# Patient Record
Sex: FEMALE | Race: BLACK OR AFRICAN AMERICAN | Employment: FULL TIME | URBAN - METROPOLITAN AREA
[De-identification: names, ages, dates, MRNs, and addresses within clinical notes are randomized per-mention and may not be internally consistent; named-entity substitution may affect disease eponyms.]

---

## 2019-01-07 ENCOUNTER — HOSPITAL ENCOUNTER (EMERGENCY)
Age: 39
Discharge: HOME | End: 2019-01-07
Attending: EMERGENCY MEDICINE | Admitting: EMERGENCY MEDICINE
Payer: MEDICAID

## 2019-01-07 ENCOUNTER — APPOINTMENT (OUTPATIENT)
Dept: GENERAL RADIOLOGY | Age: 39
End: 2019-01-07
Attending: PHYSICIAN ASSISTANT
Payer: MEDICAID

## 2019-01-07 VITALS
DIASTOLIC BLOOD PRESSURE: 98 MMHG | BODY MASS INDEX: 32.82 KG/M2 | HEIGHT: 65 IN | OXYGEN SATURATION: 100 % | TEMPERATURE: 99 F | SYSTOLIC BLOOD PRESSURE: 139 MMHG | HEART RATE: 69 BPM | RESPIRATION RATE: 16 BRPM | WEIGHT: 197 LBS

## 2019-01-07 DIAGNOSIS — R03.0 ELEVATED BLOOD PRESSURE READING: ICD-10-CM

## 2019-01-07 DIAGNOSIS — S60.211A CONTUSION OF RIGHT WRIST, INITIAL ENCOUNTER: Primary | ICD-10-CM

## 2019-01-07 PROCEDURE — L3908 WHO COCK-UP NONMOLDE PRE OTS: HCPCS

## 2019-01-07 PROCEDURE — 73110 X-RAY EXAM OF WRIST: CPT

## 2019-01-07 PROCEDURE — 99283 EMERGENCY DEPT VISIT LOW MDM: CPT

## 2019-01-07 NOTE — LETTER
[address] [place] [address] Dr [facility] EMERGENCY DEPT 
[address] [facility] [address] 
[phone] Work/School Note Date: 1/7/2019 To Whom It May concern: Klever Jorge was seen and treated today in the emergency room by the following provider(s): 
Attending Provider: Amandeep Rich MD 
Physician Assistant: Yadira Salamnaca Klever Jorge may return to work on 1/12/19. Sincerely, 
 
 
 
 
Tiffanie Marvin

## 2019-01-07 NOTE — ED PROVIDER NOTES
EMERGENCY DEPARTMENT HISTORY AND PHYSICAL EXAM 
 
3:35 PM 
 
 
Date: 2019 Patient Name: Katherine Hogan History of Presenting Illness Chief Complaint Patient presents with  Wrist Pain History Provided By: Patient Chief Complaint: R wrist pain/edema Duration:  Days Timing:  Acute Location: R wrist 
Quality: Aching Severity: Moderate Modifying Factors: worse with movement Associated Symptoms: denies any other associated signs or symptoms Additional History (Context): Katherine Hogan is a 45 y.o. female with No significant past medical history who presents with c/o R wrist pain and swelling x 2 days. Notes she had a trip and fall, [address] injury. Has tried hot and cold to relieve symptoms, as well as Tylenol. Denies head injury or LOC. Terry Padron PCP: None Past History Past Medical History: 
Past Medical History:  
Diagnosis Date  Lupus Past Surgical History: 
Past Surgical History:  
Procedure Laterality Date  HX  SECTION    
 HX TUBAL LIGATION Family History: 
History reviewed. No pertinent family history. Social History: 
Social History Tobacco Use  Smoking status: Current Every Day Smoker  Smokeless tobacco: Never Used Substance Use Topics  Alcohol use: No  
  Frequency: Never  Drug use: No  
 
 
Allergies: 
No Known Allergies Review of Systems Review of Systems Constitutional: Negative for chills and fever. Respiratory: Negative for shortness of breath. Cardiovascular: Negative for chest pain. Gastrointestinal: Negative for abdominal pain, nausea and vomiting. Musculoskeletal: Positive for joint swelling and myalgias. Skin: Negative for rash. Neurological: Negative for weakness. All other systems reviewed and are negative. Physical Exam  
 
Visit Vitals BP (!) 139/98 (BP 1 Location: Left arm, BP Patient Position: At rest) Pulse 69 Temp 99 °F (37.2 °C) Resp 16 Ht 5' 5\" (1.651 m) Wt 89.4 kg (197 lb) LMP 12/28/2018 SpO2 100% BMI 32.78 kg/m² Physical Exam  
Constitutional: She appears well-developed and well-nourished. No distress. HENT:  
Head: Normocephalic and atraumatic. Neck: Normal range of motion. Neck supple. Cardiovascular: Normal rate, regular rhythm, normal heart sounds and intact distal pulses. Exam reveals no gallop and no friction rub. No murmur heard. Pulmonary/Chest: Effort normal and breath sounds normal. No respiratory distress. She has no wheezes. She has no rales. Musculoskeletal:  
     Right elbow: Normal. 
     Right wrist: She exhibits decreased range of motion, bony tenderness (moderate diffuse edema and TTP) and swelling. Hands: 
Sensation intact throughout digits, radial pulse 2+ Neurological: She is alert. Skin: Skin is warm. No rash noted. She is not diaphoretic. Nursing note and vitals reviewed. Diagnostic Study Results Labs - No results found for this or any previous visit (from the past 12 hour(s)). Radiologic Studies -  
XR WRIST RT AP/LAT/OBL MIN 3V    (Results Pending) RADIOLOGY FINDINGS 
R wrist X-ray shows no acute process Pending review by Radiologist 
Recorded by Kimberly Marshall PA-C Medical Decision Making I am the first provider for this patient. I reviewed the vital signs, available nursing notes, past medical history, past surgical history, family history and social history. Vital Signs-Reviewed the patient's vital signs. Records Reviewed: Nursing Notes and Old Medical Records (Time of Review: 3:35 PM) ED Course: Progress Notes, Reevaluation, and Consults: 
4:11 PM  Reviewed results with patient. Discussed need for close outpatient follow-up. Discussed strict return precautions, including weakness, numbness, or any other medical concerns.  
 
Provider Notes (Medical Decision Making): 44 yo F who presents due to R wrist pain and edema x days after injury. X-ray without acute process. Extremity neurovascularly intact. Will provide velcro wrist splint and referral to orthopedic. Diagnosis Clinical Impression: 1. Contusion of right wrist, initial encounter 2. Elevated blood pressure reading Disposition: home Follow-up Information Follow up With Specialties Details Why Contact Info [id number] [facility] EMERGENCY DEPT Emergency Medicine  If symptoms worsen [address] [facility] [address] [phone] [address] Orthopedic Surgery Schedule an appointment as soon as possible for a visit  [address], [address] [facility] 
[phone] Medication List  
  
You have not been prescribed any medications.

## 2019-01-07 NOTE — ED TRIAGE NOTES
Patient states trip and fall on Sunday causing injury to right wrist when she attempted to catch herself during fall. Right wrist and forearm proximal to wrist noted to be swollen.

## 2019-01-07 NOTE — DISCHARGE INSTRUCTIONS
Patient Education        Elevated Blood Pressure: Care Instructions  Your Care Instructions    Blood pressure is a measure of how hard the blood pushes against the walls of your arteries. It's normal for blood pressure to go up and down throughout the day. But if it stays up over time, you have high blood pressure. Two numbers tell you your blood pressure. The first number is the systolic pressure. It shows how hard the blood pushes when your heart is pumping. The second number is the diastolic pressure. It shows how hard the blood pushes between heartbeats, when your heart is relaxed and filling with blood. An ideal blood pressure in adults is less than 120/80 (say \"120 over 80\"). High blood pressure is 140/90 or higher. You have high blood pressure if your top number is 140 or higher or your bottom number is 90 or higher, or both. The main test for high blood pressure is simple, fast, and painless. To diagnose high blood pressure, your doctor will test your blood pressure at different times. After testing your blood pressure, your doctor may ask you to test it again when you are home. If you are diagnosed with high blood pressure, you can work with your doctor to make a long-term plan to manage it. Follow-up care is a key part of your treatment and safety. Be sure to make and go to all appointments, and call your doctor if you are having problems. It's also a good idea to know your test results and keep a list of the medicines you take. How can you care for yourself at home? · Do not smoke. Smoking increases your risk for heart attack and stroke. If you need help quitting, talk to your doctor about stop-smoking programs and medicines. These can increase your chances of quitting for good. · Stay at a healthy weight. · Try to limit how much sodium you eat to less than 2,300 milligrams (mg) a day. Your doctor may ask you to try to eat less than 1,500 mg a day. · Be physically active.  Get at least 30 minutes of exercise on most days of the week. Walking is a good choice. You also may want to do other activities, such as running, swimming, cycling, or playing tennis or team sports. · Avoid or limit alcohol. Talk to your doctor about whether you can drink any alcohol. · Eat plenty of fruits, vegetables, and low-fat dairy products. Eat less saturated and total fats. · Learn how to check your blood pressure at home. When should you call for help? Call your doctor now or seek immediate medical care if:  ? · Your blood pressure is much higher than normal (such as 180/110 or higher). ? · You think high blood pressure is causing symptoms such as:  ¨ Severe headache. ¨ Blurry vision. ? Watch closely for changes in your health, and be sure to contact your doctor if:  ? · You do not get better as expected. Where can you learn more? Go to http://leila-giles.info/. Enter E412 in the search box to learn more about \"Elevated Blood Pressure: Care Instructions. \"  Current as of: September 21, 2016  Content Version: 11.4  © 7587-3074 [employer]. Care instructions adapted under license by [employer] (which disclaims liability or warranty for this information). If you have questions about a medical condition or this instruction, always ask your healthcare professional. [address] any warranty or liability for your use of this information.

## 2019-01-11 ENCOUNTER — OFFICE VISIT (OUTPATIENT)
Dept: ORTHOPEDIC SURGERY | Age: 39
End: 2019-01-11

## 2019-01-11 VITALS
WEIGHT: 197 LBS | HEART RATE: 68 BPM | SYSTOLIC BLOOD PRESSURE: 135 MMHG | OXYGEN SATURATION: 100 % | BODY MASS INDEX: 32.82 KG/M2 | HEIGHT: 65 IN | DIASTOLIC BLOOD PRESSURE: 89 MMHG

## 2019-01-11 DIAGNOSIS — S60.211A CONTUSION OF RIGHT WRIST, INITIAL ENCOUNTER: Primary | ICD-10-CM

## 2019-01-11 RX ORDER — IBUPROFEN 600 MG/1
600 TABLET ORAL
Qty: 30 TAB | Refills: 1 | Status: CANCELLED | OUTPATIENT
Start: 2019-01-11

## 2019-01-11 RX ORDER — IBUPROFEN 800 MG/1
800 TABLET ORAL
Qty: 30 TAB | Refills: 0 | Status: SHIPPED | OUTPATIENT
Start: 2019-01-11 | End: 2019-04-19

## 2019-01-11 NOTE — PROGRESS NOTES
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 Worker's Compensation and legal considerations: none filed. Vitals:    19 0959   BP: 135/89   Pulse: 68   SpO2: 100%   Weight: 197 lb (89.4 kg)   Height: 5' 5\" (1.651 m)   PainSc:   5   LMP: 2018           Chief Complaint   Patient presents with    Wrist Pain     right wrist pain         HPI: Patient comes in today after suffering a fall on the right upper extremity. She was seen in the emergency department and had x-rays that did not show any fracture. She was placed into a wrist splint. She has been wearing it but she took it off today. She reports swelling in her forearm. Date of onset: 2019    Injury: Yes: Comment: Fall onto arm    Prior Treatment:  Yes: Comment: Placed into a splint in the emergency department    Numbness/ Tingling: No    ROS: Review of Systems - General ROS: negative  Respiratory ROS: no cough, shortness of breath, or wheezing  Cardiovascular ROS: no chest pain or dyspnea on exertion  Musculoskeletal ROS: positive for - pain in arm - right  Neurological ROS: negative  Dermatological ROS: negative    Past Medical History:   Diagnosis Date    Lupus     Lupus        Past Surgical History:   Procedure Laterality Date    HX  SECTION      HX TUBAL LIGATION             Allergies   Allergen Reactions    Bactrim [Sulfamethoprim] Hives         PE:     RUE: There is tenderness to palpation about the right distal forearm dorsally. This is about an area of edema. Sensation is intact distally. There is no tenderness to palpation about the radiocarpal joint or the scapholunate joint. There is no instability about the radiocarpal joint or the scapholunate joint. There appears to be hematoma over the dorsal aspect of her forearm. Imaging: Plain films reviewed from the emergency department from  does not show any acute fracture or dislocation. ICD-10-CM ICD-9-CM    1.  Contusion of right wrist, initial encounter S60.211A 923.21        Plan: At this point I have recommended her continue to wear the splint to rest her wrist and immobilize. I have given her a note that says no heavy lifting for the right upper extremity. Follow-up in 2 weeks for reevaluation.     Plan was reviewed with patient, who verbalized agreement and understanding of the plan

## 2019-01-11 NOTE — LETTER
NOTIFICATION RETURN TO WORK / SCHOOL 
 
1/11/2019 10:25 AM 
 
Ms. Dottie Pace [address] [place] [address] To Whom It May Concern: Dottie Pace is currently under the care of [address]. She will return to work on 1-11-19 with the following restrictions: no heavy lifting with right upper extremity x 2 weeks. If there are questions or concerns please have the patient contact our office. Sincerely, Dolly Villaseñor, DO

## 2019-01-23 ENCOUNTER — OFFICE VISIT (OUTPATIENT)
Dept: ORTHOPEDIC SURGERY | Facility: CLINIC | Age: 39
End: 2019-01-23

## 2019-01-23 VITALS
HEART RATE: 73 BPM | TEMPERATURE: 96.3 F | HEIGHT: 65 IN | SYSTOLIC BLOOD PRESSURE: 141 MMHG | BODY MASS INDEX: 32.65 KG/M2 | DIASTOLIC BLOOD PRESSURE: 84 MMHG | OXYGEN SATURATION: 100 % | WEIGHT: 196 LBS | RESPIRATION RATE: 16 BRPM

## 2019-01-23 DIAGNOSIS — S60.211A CONTUSION OF RIGHT WRIST, INITIAL ENCOUNTER: Primary | ICD-10-CM

## 2019-01-23 DIAGNOSIS — M25.531 RIGHT WRIST PAIN: ICD-10-CM

## 2019-01-23 NOTE — LETTER
NOTIFICATION RETURN TO WORK / SCHOOL 
 
1/23/2019 8:44 AM 
 
Ms. Ashu Parish [address] [place] [address] To Whom It May Concern: Ashu Parish is currently under the care of [address] [place] [address]. She may return to work at full duty with no restrictions. If there are questions or concerns please have the patient contact our office. Sincerely, Radha Waller, DO

## 2019-01-23 NOTE — PROGRESS NOTES
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 Worker's Compensation and legal considerations: none filed. Vitals:  
 19 0815 BP: 141/84 Pulse: 73 Resp: 16 Temp: 96.3 °F (35.7 °C) TempSrc: Oral  
SpO2: 100% Weight: 196 lb (88.9 kg) Height: 5' 5\" (1.651 m) PainSc:   0 - No pain PainLoc: Wrist  
LMP: 2018 Chief Complaint Patient presents with  Wrist Pain  
  right wrist f/u HPI: Patient comes in today for follow-up from a wrist injury that she sustained 2 weeks ago. She reports to be doing much better with minimal to no pain. She reports there is continued tenderness to light touch over the dorsum of the wrist however this is minimal.  She denies any new injuries or falls. Previous HPI: Patient comes in today after suffering a fall on the right upper extremity. She was seen in the emergency department and had x-rays that did not show any fracture. She was placed into a wrist splint. She has been wearing it but she took it off today. She reports swelling in her forearm. Date of onset: 2019 Injury: Yes: Comment: Fall onto arm Prior Treatment:  Yes: Comment: Placed into a splint in the emergency department Numbness/ Tingling: No 
 
ROS: Review of Systems - General ROS: negative Respiratory ROS: no cough, shortness of breath, or wheezing Cardiovascular ROS: no chest pain or dyspnea on exertion Musculoskeletal ROS: positive for - pain in arm - right Neurological ROS: negative Dermatological ROS: negative Past Medical History:  
Diagnosis Date  Lupus  Lupus Past Surgical History:  
Procedure Laterality Date  HX  SECTION    
 HX TUBAL LIGATION Allergies Allergen Reactions  Bactrim [Sulfamethoprim] Hives PE:  
 
RUE: There is much improved tenderness to palpation about the right distal forearm dorsally.   This is about an area of edema that is significantly better. Sensation is intact distally. There is no tenderness to palpation about the radiocarpal joint or the scapholunate joint. There is negligible tenderness to palpation about the scaphoid tubercle. There is no tenderness to palpation about the anatomic snuffbox. There is no instability about the radiocarpal joint or the scapholunate joint. The dorsal forearm hematoma has resolved. Imaging:  
 
Plain films 3 views of the wrist: There is no fracture or dislocation identified in the right wrist films done today. There is no evidence of scaphoid fracture. Plain films reviewed from the emergency department from January 6 does not show any acute fracture or dislocation. ICD-10-CM ICD-9-CM 1. Contusion of right wrist, initial encounter S60.211A 923.21   
2. Right wrist pain M25.531 719.43 AMB POC XRAY, WRIST; COMPLETE, 3+ VIE Plan:  
 
Return to full duty without restrictions. Ice and elevate wrist as needed. I have instructed the patient on massage for the dorsal area that is continuing to be sensitive to encourage desensitization. Follow-up PRN Plan was reviewed with patient, who verbalized agreement and understanding of the plan

## 2019-04-19 ENCOUNTER — HOSPITAL ENCOUNTER (EMERGENCY)
Age: 39
Discharge: HOME | End: 2019-04-19
Attending: EMERGENCY MEDICINE
Payer: SELF-PAY

## 2019-04-19 VITALS
SYSTOLIC BLOOD PRESSURE: 129 MMHG | BODY MASS INDEX: 31.65 KG/M2 | HEIGHT: 65 IN | WEIGHT: 190 LBS | RESPIRATION RATE: 16 BRPM | DIASTOLIC BLOOD PRESSURE: 88 MMHG | TEMPERATURE: 99.8 F | HEART RATE: 90 BPM | OXYGEN SATURATION: 99 %

## 2019-04-19 DIAGNOSIS — L02.31 ABSCESS OF BUTTOCK, RIGHT: Primary | ICD-10-CM

## 2019-04-19 PROCEDURE — 74011250637 HC RX REV CODE- 250/637: Performed by: PHYSICIAN ASSISTANT

## 2019-04-19 PROCEDURE — 99283 EMERGENCY DEPT VISIT LOW MDM: CPT

## 2019-04-19 PROCEDURE — 75810000289 HC I&D ABSCESS SIMP/COMP/MULT

## 2019-04-19 RX ORDER — CLINDAMYCIN HYDROCHLORIDE 150 MG/1
450 CAPSULE ORAL 3 TIMES DAILY
Qty: 63 CAP | Refills: 0 | Status: SHIPPED | OUTPATIENT
Start: 2019-04-19 | End: 2019-04-26

## 2019-04-19 RX ORDER — HYDROCODONE BITARTRATE AND ACETAMINOPHEN 5; 325 MG/1; MG/1
1 TABLET ORAL
Qty: 5 TAB | Refills: 0 | Status: SHIPPED | OUTPATIENT
Start: 2019-04-19 | End: 2019-04-26

## 2019-04-19 RX ORDER — IBUPROFEN 600 MG/1
600 TABLET ORAL
Qty: 20 TAB | Refills: 0 | Status: SHIPPED | OUTPATIENT
Start: 2019-04-19 | End: 2019-08-28

## 2019-04-19 RX ORDER — HYDROCODONE BITARTRATE AND ACETAMINOPHEN 5; 325 MG/1; MG/1
1 TABLET ORAL
Status: COMPLETED | OUTPATIENT
Start: 2019-04-19 | End: 2019-04-19

## 2019-04-19 RX ADMIN — HYDROCODONE BITARTRATE AND ACETAMINOPHEN 1 TABLET: 5; 325 TABLET ORAL at 18:05

## 2019-04-19 NOTE — ED PROVIDER NOTES
EMERGENCY DEPARTMENT HISTORY AND PHYSICAL EXAM 
 
Date: 2019 Patient Name: Jil Nava History of Presenting Illness Chief Complaint Patient presents with  Abscess History Provided By: Patient Chief Complaint: Abscess Duration: 3 Days Timing:  Gradual 
Location: Right buttocks Quality: Aching and Pressure Severity: 9 out of 10 with sitting Modifying Factors: Worse with sitting Associated Symptoms: denies any other associated signs or symptoms Additional History (Context): Jil Nava is a 45 y.o. female with PMHX of lupus who presents to the emergency department C/O 2-3 days of abscess to the right buttocks. No relief with home remedies. not draining on its own. Pt reports subjective fever but has not measured it. No tylenol or motrin today. Pain worse when sitting on it. Pt denies any other sxs or complaints. No issues/pain with BMs. PCP: None Current Outpatient Medications Medication Sig Dispense Refill  clindamycin (CLEOCIN) 150 mg capsule Take 3 Caps by mouth three (3) times daily for 7 days. 63 Cap 0  ibuprofen (MOTRIN) 600 mg tablet Take 1 Tab by mouth every six (6) hours as needed for Pain. 20 Tab 0  
 HYDROcodone-acetaminophen (NORCO) 5-325 mg per tablet Take 1 Tab by mouth every six (6) hours as needed for Pain for up to 5 doses. Max Daily Amount: 4 Tabs. 5 Tab 0 Past History Past Medical History: 
Past Medical History:  
Diagnosis Date  Lupus ([facility] [address]  Lupus ([facility] [address] Past Surgical History: 
Past Surgical History:  
Procedure Laterality Date  HX  SECTION    
 HX TUBAL LIGATION Family History: 
History reviewed. No pertinent family history. Social History: 
Social History Tobacco Use  Smoking status: Current Every Day Smoker  Smokeless tobacco: Never Used Substance Use Topics  Alcohol use: No  
  Frequency: Never  Drug use: No  
 
 
Allergies: Allergies Allergen Reactions  Bactrim [Sulfamethoprim] Hives Review of Systems Review of Systems Constitutional: Negative for chills. Fever:  subjective Skin: Positive for wound. All other systems reviewed and are negative. Physical Exam  
 
Vitals:  
 04/19/19 1700 BP: 129/88 Pulse: 90 Resp: 16 Temp: 99.8 °F (37.7 °C) SpO2: 99% Weight: 86.2 kg (190 lb) Height: 5' 5\" (1.651 m) Physical Exam  
Constitutional: She appears well-developed and well-nourished. No distress. HENT:  
Head: Normocephalic and atraumatic. Right Ear: External ear normal.  
Left Ear: External ear normal.  
Nose: Nose normal.  
Eyes: Conjunctivae are normal.  
Neck: Normal range of motion. Cardiovascular: Normal rate. Pulmonary/Chest: Effort normal. No respiratory distress. Neurological: She is alert. Skin: Skin is warm and dry. She is not diaphoretic. 3 cm fluctuant area to the right buttock inside the gluteal cleft near but not involving the rectum. Psychiatric: She has a normal mood and affect. Nursing note and vitals reviewed. Diagnostic Study Results Labs - No results found for this or any previous visit (from the past 12 hour(s)). Radiologic Studies - No orders to display CT Results  (Last 48 hours) None CXR Results  (Last 48 hours) None Medications given in the ED- Medications HYDROcodone-acetaminophen (NORCO) 5-325 mg per tablet 1 Tab (1 Tab Oral Given 4/19/19 1805) Medical Decision Making I am the first provider for this patient. I reviewed the vital signs, available nursing notes, past medical history, past surgical history, family history and social history. Vital Signs-Reviewed the patient's vital signs. Records Reviewed: Nursing Notes and Old Medical Records Procedures: 
I&D Abcess Simple Date/Time: 4/19/2019 6:09 PM 
Performed by: Adrienne Hudson PA-C Authorized by: Adrienne Hudson PA-C Consent: Consent obtained:  Written Consent given by:  Patient Risks discussed:  Bleeding, incomplete drainage and pain Alternatives discussed:  Alternative treatment Location:  
  Type:  Abscess Location:  Anogenital 
  Anogenital location:  Perirectal 
Pre-procedure details:  
  Skin preparation:  Betadine Procedure type:  
  Complexity:  Simple Procedure details:  
  Needle aspiration: yes Needle size:  25 G Incision types:  Single straight Incision depth:  Dermal 
  Scalpel blade:  11 Drainage:  Purulent and bloody Drainage amount: Moderate Wound treatment:  Wound left open Packing materials:  None Post-procedure details:  
  Patient tolerance of procedure: Tolerated well, no immediate complications Provider Notes (Medical Decision Making): Appears well hydrated and non toxic, with stable vital signs, presenting with right buttocks abscess. Copious malodorous purulent drainage expressed with I&D. Only a small incision was made given proximity to the rectum, therefore patient was advised to continue warm sitz bath in addition to the antibiotics and to follow-up with her primary care physician for further evaluation. Patient was advised to have a low threshold to return to the ER for worsening pain, fevers, or any other concerns. Based on today's assessment, I feel the patient is stable for discharge to home with outpatient follow up. Return precautions and referrals provided. Diagnosis and Disposition DISCHARGE NOTE: 
Prasad Bob's  results have been reviewed with her. She has been counseled regarding her diagnosis. She verbally conveys understanding and agreement of the signs, symptoms, diagnosis, treatment and prognosis and additionally agrees to follow up as recommended with Dr. None in 24 - 48 hours. She also agrees with the care-plan and conveys that all of her questions have been answered.   I have also put together some discharge instructions for her that include: 1) educational information regarding their diagnosis, 2) how to care for their diagnosis at home, as well a 3) list of reasons why they would want to return to the ED prior to their follow-up appointment, should their condition change. CLINICAL IMPRESSION: 
 
1. Abscess of buttock, right PLAN: 
1. D/C Home 2. Current Discharge Medication List  
  
START taking these medications Details  
clindamycin (CLEOCIN) 150 mg capsule Take 3 Caps by mouth three (3) times daily for 7 days. Qty: 63 Cap, Refills: 0  
  
ibuprofen (MOTRIN) 600 mg tablet Take 1 Tab by mouth every six (6) hours as needed for Pain. Qty: 20 Tab, Refills: 0 HYDROcodone-acetaminophen (NORCO) 5-325 mg per tablet Take 1 Tab by mouth every six (6) hours as needed for Pain for up to 5 doses. Max Daily Amount: 4 Tabs. Qty: 5 Tab, Refills: 0 Associated Diagnoses: Abscess of buttock, right 3. Follow-up Information Follow up With Specialties Details Why Contact Info [id number] [facility] EMERGENCY DEPT Emergency Medicine In 2 days For wound re-check [place] [address] 
[phone] [place]\[facility]\"" 139  for primary care needs [address] [address]) [address] [phone] [address] [facility] EMERGENCY DEPT Emergency Medicine  As needed, If symptoms worsen [address] [id number] [phone]

## 2019-04-19 NOTE — DISCHARGE INSTRUCTIONS
Antibiotics as prescribed. Motrin for pain. Norco for more severe pain. Warm sitz baths several times a day. Return in 48 for wound check. Return to the ER for persistent or worsening symptoms, chest pain, shortness of breath, fevers, concerns about dehydration, if you are unable to follow up as instructed, and for any other concerns.

## 2019-04-19 NOTE — ED NOTES
I have reviewed discharge instructions with the patient. The patient verbalized understanding. Patient armband removed and shredded. Pt is accompanied by visitor who is driving her home. Pt was warned not to drive while taking Norco.  She verbalized understanding.

## 2019-04-21 ENCOUNTER — HOSPITAL ENCOUNTER (EMERGENCY)
Age: 39
Discharge: HOME | End: 2019-04-21
Attending: EMERGENCY MEDICINE
Payer: SELF-PAY

## 2019-04-21 VITALS
OXYGEN SATURATION: 100 % | TEMPERATURE: 98.5 F | DIASTOLIC BLOOD PRESSURE: 77 MMHG | HEART RATE: 72 BPM | SYSTOLIC BLOOD PRESSURE: 115 MMHG | RESPIRATION RATE: 20 BRPM

## 2019-04-21 DIAGNOSIS — L02.91 ABSCESS: Primary | ICD-10-CM

## 2019-04-21 PROCEDURE — 75810000275 HC EMERGENCY DEPT VISIT NO LEVEL OF CARE

## 2019-04-21 NOTE — ED NOTES
Lizzy Dang is a 45 y.o. female that was discharged in good condition. The patients diagnosis, condition and treatment were explained to  patient and aftercare instructions were given. The patient verbalized understanding. Patient armband removed and shredded.

## 2019-04-21 NOTE — ED PROVIDER NOTES
EMERGENCY DEPARTMENT HISTORY AND PHYSICAL EXAM 
 
Date: 2019 Patient Name: Cherie Guaman History of Presenting Illness Chief Complaint Patient presents with  Wound Check History Provided By patient Chief Complaint: wound check Duration: 3 days Timing acute Location: buttocks [place] Severity:moderate Modifying Factors: better with abx Associated Symptoms: none Additional History (Context): Cherie Guaman is a 45 y.o. female with PMH lupus who presents for re-evaluation of an abscess to her buttocks x 2-3 days. Pt has an I and D 2 days ago but denies having packing placed into the wound. The pt states she is taking abx and the area is still draining. No other complaints. PCP: None Current Outpatient Medications Medication Sig Dispense Refill  clindamycin (CLEOCIN) 150 mg capsule Take 3 Caps by mouth three (3) times daily for 7 days. 63 Cap 0  ibuprofen (MOTRIN) 600 mg tablet Take 1 Tab by mouth every six (6) hours as needed for Pain. 20 Tab 0  
 HYDROcodone-acetaminophen (NORCO) 5-325 mg per tablet Take 1 Tab by mouth every six (6) hours as needed for Pain for up to 5 doses. Max Daily Amount: 4 Tabs. 5 Tab 0 Past History Past Medical History: 
Past Medical History:  
Diagnosis Date  Lupus ([facility] [address]  Lupus ([facility] [address] Past Surgical History: 
Past Surgical History:  
Procedure Laterality Date  HX  SECTION    
 HX TUBAL LIGATION Family History: No family history on file. Social History: 
Social History Tobacco Use  Smoking status: Current Every Day Smoker  Smokeless tobacco: Never Used Substance Use Topics  Alcohol use: No  
  Frequency: Never  Drug use: No  
 
 
Allergies: Allergies Allergen Reactions  Bactrim [Sulfamethoprim] Hives Review of Systems Review of Systems Constitutional: Negative. Negative for chills and fever. HENT: Negative. Negative for congestion, ear pain and rhinorrhea. Eyes: Negative. Negative for pain and redness. Respiratory: Negative. Negative for cough, shortness of breath, wheezing and stridor. Cardiovascular: Negative. Negative for chest pain and leg swelling. Gastrointestinal: Negative. Negative for abdominal pain, constipation, diarrhea, nausea and vomiting. Genitourinary: Negative. Negative for dysuria and frequency. Musculoskeletal: Negative. Negative for back pain and neck pain. Skin: Positive for wound. Negative for rash. Neurological: Negative. Negative for dizziness, seizures, syncope and headaches. All other systems reviewed and are negative. All Other Systems Negative Physical Exam  
 
Vitals:  
 04/21/19 1222 BP: 115/77 Pulse: 72 Resp: 20 Temp: 98.5 °F (36.9 °C) SpO2: 100% Physical Exam  
Constitutional: She is oriented to person, place, and time. She appears well-developed and well-nourished. No distress. HENT:  
Head: Normocephalic and atraumatic. Eyes: Conjunctivae are normal. Right eye exhibits no discharge. Left eye exhibits no discharge. No scleral icterus. Neck: Normal range of motion. Neck supple. Cardiovascular: Normal rate. Pulmonary/Chest: Effort normal. No stridor. No respiratory distress. Musculoskeletal: Normal range of motion. Neurological: She is alert and oriented to person, place, and time. Coordination normal.  
Gait is steady. Able to ambulate without difficulty. Skin: Skin is warm and dry. No rash noted. She is not diaphoretic. There is erythema. Small 3 cm slightly erythematous abscess noted to the R inner buttocks, near the rectum. Area is open and a scant amount of purulence is noted on palpation. Mildly TTP Psychiatric: She has a normal mood and affect. Her behavior is normal. Thought content normal.  
Nursing note and vitals reviewed. Diagnostic Study Results Labs - 
 No results found for this or any previous visit (from the past 12 hour(s)). Radiologic Studies - No orders to display CT Results  (Last 48 hours) None CXR Results  (Last 48 hours) None Medical Decision Making I am the first provider for this patient. I reviewed the vital signs, available nursing notes, past medical history, past surgical history, family history and social history. Vital Signs-Reviewed the patient's vital signs. Records Reviewed: Jeana Kelly PA-C Procedures: 
Procedures Provider Notes (Medical Decision Making): Impression:  Abscess The wound appears to be healing well. Patient still has several days of antibiotics left. Will recommend the patient continue to do warm compress at home as well as complete her course of antibiotics. PCP follow-up as needed. Pt agrees. Jeana Kelly PA-C  
 
MED RECONCILIATION: 
No current facility-administered medications for this encounter. Current Outpatient Medications Medication Sig  
 clindamycin (CLEOCIN) 150 mg capsule Take 3 Caps by mouth three (3) times daily for 7 days.  ibuprofen (MOTRIN) 600 mg tablet Take 1 Tab by mouth every six (6) hours as needed for Pain.  HYDROcodone-acetaminophen (NORCO) 5-325 mg per tablet Take 1 Tab by mouth every six (6) hours as needed for Pain for up to 5 doses. Max Daily Amount: 4 Tabs. Disposition: D/c 
 
DISCHARGE NOTE:  
Patient is stable for discharge at this time. No new rx given. Rest and follow-up with PCP this week. Return to the ED immediately for any new or worsening sx. Jeana Ritter PA-C 12:39 PM  
 
Follow-up Information Follow up With Specialties Details Why Contact [facility] Schedule an appointment as soon as possible for a visit As needed [address] [place] [address] 
[phone] [address] [facility]   As needed [address] [facility] [address] 
[phone] Derekdimitrishank  As needed [address] [place] [address] [phone] [address] [facility] EMERGENCY DEPT Emergency Medicine  As needed, If symptoms worsen [address] [place] [address] [phone] Current Discharge Medication List  
  
CONTINUE these medications which have NOT CHANGED Details  
clindamycin (CLEOCIN) 150 mg capsule Take 3 Caps by mouth three (3) times daily for 7 days. Qty: 63 Cap, Refills: 0  
  
ibuprofen (MOTRIN) 600 mg tablet Take 1 Tab by mouth every six (6) hours as needed for Pain. Qty: 20 Tab, Refills: 0 HYDROcodone-acetaminophen (NORCO) 5-325 mg per tablet Take 1 Tab by mouth every six (6) hours as needed for Pain for up to 5 doses. Max Daily Amount: 4 Tabs. Qty: 5 Tab, Refills: 0 Associated Diagnoses: Abscess of buttock, right Diagnosis Clinical Impression: 1. Abscess

## 2019-04-21 NOTE — DISCHARGE INSTRUCTIONS
Patient Education        Skin Abscess: Care Instructions  Your Care Instructions    A skin abscess is a bacterial infection that forms a pocket of pus. A boil is a kind of skin abscess. The doctor may have cut an opening in the abscess so that the pus can drain out. You may have gauze in the cut so that the abscess will stay open and keep draining. You may need antibiotics. You will need to follow up with your doctor to make sure the infection has gone away. The doctor has checked you carefully, but problems can develop later. If you notice any problems or new symptoms, get medical treatment right away. Follow-up care is a key part of your treatment and safety. Be sure to make and go to all appointments, and call your doctor if you are having problems. It's also a good idea to know your test results and keep a list of the medicines you take. How can you care for yourself at home? · Apply warm and dry compresses, a heating pad set on low, or a hot water bottle 3 or 4 times a day for pain. Keep a cloth between the heat source and your skin. · If your doctor prescribed antibiotics, take them as directed. Do not stop taking them just because you feel better. You need to take the full course of antibiotics. · Take pain medicines exactly as directed. ? If the doctor gave you a prescription medicine for pain, take it as prescribed. ? If you are not taking a prescription pain medicine, ask your doctor if you can take an over-the-counter medicine. · Keep your bandage clean and dry. Change the bandage whenever it gets wet or dirty, or at least one time a day. · If the abscess was packed with gauze:  ? Keep follow-up appointments to have the gauze changed or removed. If the doctor instructed you to remove the gauze, follow the instructions you were given for how to remove it. ? After the gauze is removed, soak the area in warm water for 15 to 20 minutes 2 times a day, until the wound closes.   When should you call for help? Call your doctor now or seek immediate medical care if:    · You have signs of worsening infection, such as:  ? Increased pain, swelling, warmth, or redness. ? Red streaks leading from the infected skin. ? Pus draining from the wound. ? A fever.    Watch closely for changes in your health, and be sure to contact your doctor if:    · You do not get better as expected. Where can you learn more? Go to http://leila-giles.info/. Enter X813 in the search box to learn more about \"Skin Abscess: Care Instructions. \"  Current as of: 2018  Content Version: 11.9  © 0671-9272 [employer] ([employer]). Care instructions adapted under license by [employer] (which disclaims liability or warranty for this information). If you have questions about a medical condition or this instruction, always ask your healthcare professional. [place] [address] any warranty or liability for your use of this information. FlowJob Activation    Thank you for requesting access to FlowJob. Please follow the instructions below to securely access and download your online medical record. FlowJob allows you to send messages to your doctor, view your test results, renew your prescriptions, schedule appointments, and more. How Do I Sign Up? 1. In your internet browser, go to [email or web address]  2. Click on the First Time User? Click Here link in the Sign In box. You will be redirect to the New Member Sign Up page. 3. Enter your FlowJob Access Code exactly as it appears below. You will not need to use this code after youve completed the sign-up process. If you do not sign up before the expiration date, you must request a new code. FlowJob Access Code: Activation code not generated  Current FlowJob Status: Active (This is the date your FlowJob access code will )    4.  Enter the last four digits of your Social Security Number (xxxx) and Date of Birth (mm/dd/yyyy) as indicated and click Submit. You will be taken to the next sign-up page. 5. Create a Stemgent ID. This will be your Stemgent login ID and cannot be changed, so think of one that is secure and easy to remember. 6. Create a Stemgent password. You can change your password at any time. 7. Enter your Password Reset Question and Answer. This can be used at a later time if you forget your password. 8. Enter your e-mail address. You will receive e-mail notification when new information is available in [address]. 9. Click Sign Up. You can now view and download portions of your medical record. 10. Click the Download Summary menu link to download a portable copy of your medical information. Additional Information    If you have questions, please visit the Frequently Asked Questions section of the Stemgent website at [email or web address] Remember, Stemgent is NOT to be used for urgent needs. For medical emergencies, dial 911. Complete all medications as prescribed. Follow-up with primary care doctor in 1 week. Return to the ED immediately for any new or worsening symptoms.

## 2019-08-28 ENCOUNTER — HOSPITAL ENCOUNTER (EMERGENCY)
Age: 39
Discharge: HOME | End: 2019-08-28
Attending: EMERGENCY MEDICINE
Payer: COMMERCIAL

## 2019-08-28 ENCOUNTER — APPOINTMENT (OUTPATIENT)
Dept: GENERAL RADIOLOGY | Age: 39
End: 2019-08-28
Attending: PHYSICIAN ASSISTANT
Payer: COMMERCIAL

## 2019-08-28 VITALS
OXYGEN SATURATION: 100 % | WEIGHT: 196 LBS | DIASTOLIC BLOOD PRESSURE: 86 MMHG | BODY MASS INDEX: 32.62 KG/M2 | TEMPERATURE: 99.2 F | RESPIRATION RATE: 16 BRPM | SYSTOLIC BLOOD PRESSURE: 136 MMHG | HEART RATE: 63 BPM

## 2019-08-28 DIAGNOSIS — M25.511 PAIN IN JOINT OF RIGHT SHOULDER: Primary | ICD-10-CM

## 2019-08-28 PROCEDURE — 99282 EMERGENCY DEPT VISIT SF MDM: CPT

## 2019-08-28 PROCEDURE — 73030 X-RAY EXAM OF SHOULDER: CPT

## 2019-08-28 RX ORDER — NAPROXEN 500 MG/1
500 TABLET ORAL 2 TIMES DAILY WITH MEALS
Qty: 20 TAB | Refills: 0 | Status: SHIPPED | OUTPATIENT
Start: 2019-08-28 | End: 2019-09-07

## 2019-08-28 RX ORDER — LIDOCAINE 50 MG/G
PATCH TOPICAL
Qty: 1 PACKAGE | Refills: 0 | Status: SHIPPED | OUTPATIENT
Start: 2019-08-28 | End: 2019-11-14

## 2019-08-28 NOTE — ED PROVIDER NOTES
EMERGENCY DEPARTMENT HISTORY AND PHYSICAL EXAM    1:36 PM      Date: 2019  Patient Name: Radha Arboleda    History of Presenting Illness     Chief Complaint   Patient presents with    Shoulder Pain         History Provided By: Patient    Additional History (Context): Radha Arboleda is a 45 y.o. female with hx of lupus who presents with c/o R shoulder pain x 3-4 weeks. Pt notes she has a hx of bursitis and received cortisone injections years ago for the symptoms. Pt notes pain is worse with movement and palpation. 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 Notes she has been taking  Motrin for symptoms without relief. Denies recent injury or trauma, fever or chills, chest pain, shortness of breath, extremity weakness. PCP: None    Current Outpatient Medications   Medication Sig Dispense Refill    naproxen (NAPROSYN) 500 mg tablet Take 1 Tab by mouth two (2) times daily (with meals) for 10 days. 20 Tab 0    lidocaine (LIDODERM) 5 % Apply patch to the affected area for 12 hours a day and remove for 12 hours a day. 1 Package 0       Past History     Past Medical History:  Past Medical History:   Diagnosis Date    Lup[facility] ([facility] [address] [facility]     Lupus ([facility] [address]        Past Surgical History:  Past Surgical History:   Procedure Laterality Date    HX  SECTION      HX TUBAL LIGATION         Family History:  History reviewed. No pertinent family history. Social History:  Social History     Tobacco Use    Smoking status: Current Every Day Smoker    Smokeless tobacco: Never Used   Substance Use Topics    Alcohol use: No     Frequency: Never    Drug use: No       Allergies: Allergies   Allergen Reactions    Bactrim [Sulfamethoprim] Hives         Review of Systems       Review of Systems   Constitutional: Negative for chills and fever. Respiratory: Negative for shortness of breath. Cardiovascular: Negative for chest pain. Gastrointestinal: Negative for abdominal pain, nausea and vomiting. Musculoskeletal: Positive for myalgias. Skin: Negative for rash. Neurological: Negative for weakness. All other systems reviewed and are negative. Physical Exam     Visit Vitals  /86   Pulse 63   Temp 99.2 °F (37.3 °C)   Resp 16   Wt 88.9 kg (196 lb)   LMP 08/25/2019   SpO2 100%   BMI 32.62 kg/m²         Physical Exam   Constitutional: She appears well-developed and well-nourished. No distress. HENT:   Head: Normocephalic and atraumatic. Neck: Normal range of motion. Neck supple. Cardiovascular: Normal rate, regular rhythm, normal heart sounds and intact distal pulses. Exam reveals no gallop and no friction rub. No murmur heard. Pulmonary/Chest: Effort normal and breath sounds normal. No respiratory distress. She has no wheezes. She has no rales. Musculoskeletal: Normal range of motion. Right shoulder: She exhibits tenderness (lateral aspect of shoulder TTP). She exhibits normal range of motion, no swelling, no effusion, no crepitus, no deformity, no pain, no spasm, normal pulse and normal strength.  strength 5/5, radial pulse 2+    Neurological: She is alert. Skin: Skin is warm. No rash noted. She is not diaphoretic. Nursing note and vitals reviewed. Diagnostic Study Results     Labs -  No results found for this or any previous visit (from the past 12 hour(s)). Radiologic Studies -   XR SHOULDER RT AP/LAT MIN 2 V    (Results Pending)   no acute process      Medical Decision Making   I am the first provider for this patient. I reviewed the vital signs, available nursing notes, past medical history, past surgical history, family history and social history. Vital Signs-Reviewed the patient's vital signs. Records Reviewed: Nursing Notes and Old Medical Records (Time of Review: 1:36 PM)    ED Course: Progress Notes, Reevaluation, and Consults:  2:06 PM  Reviewed results with patient.  Discussed need for close outpatient follow-up with orthopedic for further assessment. Discussed strict return precautions, including arm weakness, discoloration, or any other medical concerns    Provider Notes (Medical Decision Making): 80-year-old female who presents due to right shoulder pain x weeks. Afebrile, nontoxic-appearing. No evidence of cellulitis, septic joint, injury or trauma. Extremity neurovascular intact. X-ray without acute process. Signs and symptoms consistent with strain versus bursitis. Will discharge with symptomatic management and referral to orthopedic for further assessment      Diagnosis     Clinical Impression:   1. Pain in joint of right shoulder        Disposition: home     Follow-up Information     Follow up With Specialties Details Why [place] [address] EMERGENCY DEPT Emergency Medicine  If symptoms worsen [address] [address]  [address] [facility] Orthopedic Surgery Schedule an appointment as soon as possible for a visit  [address]  [phone]           Patient's Medications   Start Taking    LIDOCAINE (LIDODERM) 5 %    Apply patch to the affected area for 12 hours a day and remove for 12 hours a day. NAPROXEN (NAPROSYN) 500 MG TABLET    Take 1 Tab by mouth two (2) times daily (with meals) for 10 days. Continue Taking    No medications on file   These Medications have changed    No medications on file   Stop Taking    IBUPROFEN (MOTRIN) 600 MG TABLET    Take 1 Tab by mouth every six (6) hours as needed for Pain. Dictation disclaimer:  Please note that this dictation was completed with [employer], the [employer] voice recognition software. Quite often unanticipated grammatical, syntax, homophones, and other interpretive errors are inadvertently transcribed by the computer software. Please disregard these errors. Please excuse any errors that have escaped final proofreading.

## 2019-08-28 NOTE — ED NOTES
Current Discharge Medication List      START taking these medications    Details   naproxen (NAPROSYN) 500 mg tablet Take 1 Tab by mouth two (2) times daily (with meals) for 10 days. Qty: 20 Tab, Refills: 0      lidocaine (LIDODERM) 5 % Apply patch to the affected area for 12 hours a day and remove for 12 hours a day. Qty: 1 Package, Refills: 0         STOP taking these medications       ibuprofen (MOTRIN) 600 mg tablet Comments:   Reason for Stopping:           Patient armband removed and shredded  Prescriptions given and reviewed with patient.

## 2019-08-28 NOTE — DISCHARGE INSTRUCTIONS
Patient Education      Take medication as prescribed. Follow-up with the orthopedic physician within 2 days for reassessment. Bring the results from this visit with you for their review. Return to the ED immediately for any new, worsening, or persistent symptoms, including weakness, numbness, or any other medical concerns. Musculoskeletal Pain: Care Instructions  Your Care Instructions    Different problems with the bones, muscles, nerves, ligaments, and tendons in the body can cause pain. One or more areas of your body may ache or burn. Or they may feel tired, stiff, or sore. The medical term for this type of pain is musculoskeletal pain. It can have many different causes. Sometimes the pain is caused by an injury such as a strain or sprain. Or you might have pain from using one part of your body in the same way over and over again. This is called overuse. In some cases, the cause of the pain is another health problem such as arthritis or fibromyalgia. The doctor will examine you and ask you questions about your health to help find the cause of your pain. Blood tests or imaging tests like an X-ray may also be helpful. But sometimes doctors can't find a cause of the pain. Treatment depends on your symptoms and the cause of the pain, if known. The doctor has checked you carefully, but problems can develop later. If you notice any problems or new symptoms, get medical treatment right away. Follow-up care is a key part of your treatment and safety. Be sure to make and go to all appointments, and call your doctor if you are having problems. It's also a good idea to know your test results and keep a list of the medicines you take. How can you care for yourself at home? · Rest until you feel better. · Do not do anything that makes the pain worse. Return to exercise gradually if you feel better and your doctor says it's okay. · Be safe with medicines. Read and follow all instructions on the label.   ? If the doctor gave you a prescription medicine for pain, take it as prescribed. ? If you are not taking a prescription pain medicine, ask your doctor if you can take an over-the-counter medicine. · Put ice or a cold pack on the area for 10 to 20 minutes at a time to ease pain. Put a thin cloth between the ice and your skin. When should you call for help? Call your doctor now or seek immediate medical care if:    · You have new pain, or your pain gets worse.     · You have new symptoms such as a fever, a rash, or chills.    Watch closely for changes in your health, and be sure to contact your doctor if:    · You do not get better as expected. Where can you learn more? Go to http://leila-giles.info/. Enter O688 in the search box to learn more about \"Musculoskeletal Pain: Care Instructions. \"  Current as of: March 28, 2019  Content Version: 12.1  © 2909-2904 [employer]. Care instructions adapted under license by [employer] (which disclaims liability or warranty for this information). If you have questions about a medical condition or this instruction, always ask your healthcare professional. [place] [address] any warranty or liability for your use of this information. Patient Education        Shoulder Pain: Care Instructions  Your Care Instructions    You can hurt your shoulder by using it too much during an activity, such as fishing or baseball. It can also happen as part of the everyday wear and tear of getting older. Shoulder injuries can be slow to heal, but your shoulder should get better with time. Your doctor may recommend a sling to rest your shoulder. If you have injured your shoulder, you may need testing and treatment. Follow-up care is a key part of your treatment and safety. Be sure to make and go to all appointments, and call your doctor if you are having problems.  It's also a good idea to know your test results and keep a list of the medicines you take. How can you care for yourself at home? · Take pain medicines exactly as directed. ? If the doctor gave you a prescription medicine for pain, take it as prescribed. ? If you are not taking a prescription pain medicine, ask your doctor if you can take an over-the-counter medicine. ? Do not take two or more pain medicines at the same time unless the doctor told you to. Many pain medicines contain acetaminophen, which is Tylenol. Too much acetaminophen (Tylenol) can be harmful. · If your doctor recommends that you wear a sling, use it as directed. Do not take it off before your doctor tells you to. · Put ice or a cold pack on the sore area for 10 to 20 minutes at a time. Put a thin cloth between the ice and your skin. · If there is no swelling, you can put moist heat, a heating pad, or a warm cloth on your shoulder. Some doctors suggest alternating between hot and cold. · Rest your shoulder for a few days. If your doctor recommends it, you can then begin gentle exercise of the shoulder, but do not lift anything heavy. When should you call for help? Call 911 anytime you think you may need emergency care. For example, call if:    · You have chest pain or pressure. This may occur with:  ? Sweating. ? Shortness of breath. ? Nausea or vomiting. ? Pain that spreads from the chest to the neck, jaw, or one or both shoulders or arms. ? Dizziness or lightheadedness. ? A fast or uneven pulse. After calling 911, chew 1 adult-strength aspirin. Wait for an ambulance. Do not try to drive yourself.     · Your arm or hand is cool or pale or changes color.    Call your doctor now or seek immediate medical care if:    · You have signs of infection, such as:  ? Increased pain, swelling, warmth, or redness in your shoulder. ? Red streaks leading from a place on your shoulder. ? Pus draining from an area of your shoulder. ? Swollen lymph nodes in your neck, armpits, or groin.   ? A fever.    Watch closely for changes in your health, and be sure to contact your doctor if:    · You cannot use your shoulder.     · Your shoulder does not get better as expected. Where can you learn more? Go to http://leila-giles.info/. Enter D382 in the search box to learn more about \"Shoulder Pain: Care Instructions. \"  Current as of: September 20, 2018  Content Version: 12.1  © 6801-8944 [employer] Care instructions adapted under license by [employer] (which disclaims liability or warranty for this information). If you have questions about a medical condition or this instruction, always ask your healthcare professional. [place] [address] any warranty or liability for your use of this information. Patient Education        Shoulder Stretches: Exercises  Your Care Instructions  Here are some examples of exercises for your shoulder. Start each exercise slowly. Ease off the exercise if you start to have pain. Your doctor or physical therapist will tell you when you can start these exercises and which ones will work best for you. How to do the exercises  Shoulder stretch    1.  a doorway and place one arm against the door frame. Your elbow should be a little higher than your shoulder. 2. Relax your shoulders as you lean forward, allowing your chest and shoulder muscles to stretch. You can also turn your body slightly away from your arm to stretch the muscles even more. 3. Hold for 15 to 30 seconds. 4. Repeat 2 to 4 times with each arm. Shoulder and chest stretch    1. Shoulder and chest stretch  2. While sitting, relax your upper body so you slump slightly in your chair. 3. As you breathe in, straighten your back and open your arms out to the sides. 4. Gently pull your shoulder blades back and downward. 5. Hold for 15 to 30 seconds as your breathe normally. 6. Repeat 2 to 4 times. Overhead stretch    1.  Reach up over your head with both arms.  2. Hold for 15 to 30 seconds. 3. Repeat 2 to 4 times. Follow-up care is a key part of your treatment and safety. Be sure to make and go to all appointments, and call your doctor if you are having problems. It's also a good idea to know your test results and keep a list of the medicines you take. Where can you learn more? Go to http://leila-giles.info/. Enter S254 in the search box to learn more about \"Shoulder Stretches: Exercises. \"  Current as of: September 20, 2018  Content Version: 12.1  © 0715-8806 [employer], [employer]. Care instructions adapted under license by [employer] (which disclaims liability or warranty for this information). If you have questions about a medical condition or this instruction, always ask your healthcare professional. [address] any warranty or liability for your use of this information.

## 2019-08-28 NOTE — LETTER
[address] [place] [address] [facility] 
[address] [facility] [address] 
[phone] Work/School Note Date: 8/28/2019 To Whom It May concern: Jamison Quach was seen and treated today in the emergency room by the following provider(s): 
Attending Provider: Ruben Gonzalez MD 
Physician Assistant: JOSE ANGEL Natarajan. Jamison Quach may return to work on 8/31/19.  
 
Sincerely, 
 
 
 
 
JOSE ANGEL Vogel

## 2019-08-29 ENCOUNTER — OFFICE VISIT (OUTPATIENT)
Dept: ORTHOPEDIC SURGERY | Facility: CLINIC | Age: 39
End: 2019-08-29

## 2019-08-29 VITALS
OXYGEN SATURATION: 96 % | DIASTOLIC BLOOD PRESSURE: 91 MMHG | HEART RATE: 65 BPM | TEMPERATURE: 97.8 F | RESPIRATION RATE: 16 BRPM | SYSTOLIC BLOOD PRESSURE: 142 MMHG | WEIGHT: 197 LBS | HEIGHT: 65 IN | BODY MASS INDEX: 32.82 KG/M2

## 2019-08-29 DIAGNOSIS — M75.51 CHRONIC BURSITIS OF RIGHT SHOULDER: Primary | ICD-10-CM

## 2019-08-29 DIAGNOSIS — M32.9 LUPUS (HCC): ICD-10-CM

## 2019-08-29 DIAGNOSIS — M75.81 TENDINITIS OF RIGHT ROTATOR CUFF: ICD-10-CM

## 2019-08-29 DIAGNOSIS — G89.29 CHRONIC RIGHT SHOULDER PAIN: ICD-10-CM

## 2019-08-29 DIAGNOSIS — M25.511 CHRONIC RIGHT SHOULDER PAIN: ICD-10-CM

## 2019-08-29 RX ORDER — BETAMETHASONE SODIUM PHOSPHATE AND BETAMETHASONE ACETATE 3; 3 MG/ML; MG/ML
6 INJECTION, SUSPENSION INTRA-ARTICULAR; INTRALESIONAL; INTRAMUSCULAR; SOFT TISSUE ONCE
Qty: 0.5 ML | Refills: 0
Start: 2019-08-29 | End: 2019-08-29

## 2019-08-29 NOTE — PROGRESS NOTES
Patient: Ash Stevens                MRN: [id number]       SSN: [id number]  YOB: 1980        AGE: 45 y.o. SEX: female    PCP: None  08/29/19    Chief Complaint   Patient presents with    Shoulder Pain     right shoulder pain     HISTORY:  Ash Stevens is a 45 y.o. female who is seen for right shoulder pain. She has been experiencing shoulder pain for the past several weeks. She just woke up with pain one day. She states her pain started in her neck and is radiating to her right shoulder. She was seen at [facility] yesterday where right shoulder x rays revealed no acute findings. She has a h/o calcific rotator cuff tendonitis a few years ago. She notes shoulder pain with overhead activities and at night. She takes Naproxen to alleviate her pain. She is seen by Dr. Edwin Barrios for right wrist pain. She has a h/o lupus. 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 She lives in [place] with her 14 yo daughter, her female friend and her friend's family. Ms. Phoenix weighs 197 lbs and is 5'5\" tall. No results found for: HBA1C, HGBE8, UKU7DUHR, IBY9DSJK, AWR1ZTWK  Weight Metrics 8/29/2019 8/28/2019 4/19/2019 1/23/2019 1/11/2019 1/7/2019   Weight 197 lb 196 lb 190 lb 196 lb 197 lb 197 lb   BMI 32.78 kg/m2 32.62 kg/m2 31.62 kg/m2 32.62 kg/m2 32.78 kg/m2 32.78 kg/m2       There is no problem list on file for this patient.     REVIEW OF SYSTEMS: All Below are Negative except: See HPI   Constitutional: negative for fever, chills, and weight loss. Cardiovascular: negative for chest pain, claudication, leg swelling, SOB, LOCKE   Gastrointestinal: Negative for pain, N/V/C/D, Blood in stool or urine, dysuria, hematuria, incontinence, pelvic pain. Musculoskeletal: See HPI   Neurological: Negative for dizziness and weakness. Negative for headaches, Visual changes, confusion, seizures   Phychiatric/Behavioral: Negative for depression, memory loss, substance abuse. Extremities: Negative for hair changes, rash, or skin lesion changes. Hematologic: Negative for bleeding problems, bruising, pallor or swollen lymph nodes   Peripheral Vascular: No calf pain, no circulation deficits.     Social History     Socioeconomic History    Marital status: SINGLE     Spouse name: Not on file    Number of children: Not on file    Years of education: Not on file    Highest education level: Not on file   Occupational History    Not on file   Social Needs    Financial resource strain: Not on file    Food insecurity:     Worry: Not on file     Inability: Not on file    Transportation needs:     Medical: Not on file     Non-medical: Not on file   Tobacco Use    Smoking status: Current Every Day Smoker    Smokeless tobacco: Never Used   Substance and Sexual Activity    Alcohol use: No     Frequency: Never    Drug use: No    Sexual activity: Not on file   Lifestyle    Physical activity:     Days per week: Not on file     Minutes per session: Not on file    Stress: Not on file   Relationships    Social connections:     Talks on phone: Not on file     Gets together: Not on file     Attends Hinduism service: Not on file     Active member of club or organization: Not on file     Attends meetings of clubs or organizations: Not on file     Relationship status: Not on file    Intimate partner violence:     Fear of current or ex partner: Not on file     Emotionally abused: Not on file     Physically abused: Not on file Forced sexual activity: Not on file   Other Topics Concern    Not on file   Social History Narrative    Not on file      Allergies   Allergen Reactions    Bactrim [Sulfamethoprim] Hives      Current Outpatient Medications   Medication Sig    naproxen (NAPROSYN) 500 mg tablet Take 1 Tab by mouth two (2) times daily (with meals) for 10 days.  lidocaine (LIDODERM) 5 % Apply patch to the affected area for 12 hours a day and remove for 12 hours a day. No current facility-administered medications for this visit. PHYSICAL EXAMINATION:  Visit Vitals  BP (!) 142/91 (BP 1 Location: Left arm, BP Patient Position: Sitting)   Pulse 65   Temp 97.8 °F (36.6 °C) (Oral)   Resp 16   Ht 5' 5\" (1.651 m)   Wt 197 lb (89.4 kg)   LMP 08/25/2019   SpO2 96%   BMI 32.78 kg/m²    Appearance: Alert, well appearing and pleasant patient who is in no distress, oriented to person, place/time, and who follows commands. HEENT: Erick Londono hears well, does not require hearing aids. Her sclera of the eyes are non-icteric. She is breathing normally and no respiratory accessory muscle use is noted. No JVD present and Neck ROM within normal limits. Psychiatric: Affect and mood are appropriate. Oriented x3  Heart[de-identified]  S1, S2 without murmer, regular rhythm  Lungs:  Breath sounds clear to auscultation  Cardiovascular/Peripheral Vascular: Normal pulses to each foot. Integumentary: No rashes,  wounds, or abrasions. Warm and normal color. No drainage.    Gait: normal  Sensory Exam: Intact/Normal Sensation    Lymphatic: No evidence of Lymphedema  Vascular:       Pulses: palpable  Varicosities none  Wounds/Abrasion: None Present  Neuro: Negative, no tremors  ORTHO EXAMINATION:  Examination Right shoulder Left shoulder   Skin Intact Intact   Effusion - -   Biceps deformity - -   Atrophy - -   AC joint tenderness - -   Acromial tenderness + +   Biceps tenderness - -   Forward flexion/Elevation  170   Active abduction  160 External rotation ROM 30 30   Internal rotation ROM 75 90   Apprehension - -   Impingement - -   Drop Arm Test - -   Neurovascular Intact Intact       TIME OUT:  Chart reviewed for the following:   I, Adali Soto MD, have reviewed the History, Physical and updated the Allergic reactions for [address] performed immediately prior to start of procedure:  Branden Marinelli MD, have performed the following reviews on [facility] [address] prior to the start of the procedure:          * Patient was identified by name and date of birth   * Agreement on procedure being performed was verified  * Risks and Benefits explained to the patient  * Procedure site verified and marked as necessary  * Patient was positioned for comfort  * Consent was obtained     Time: 3:08 PM     Date of procedure: 8/29/2019  Procedure performed by:  Adali Soto MD  Ms. Kathy Ha tolerated the procedure well with no complications. RADIOGRAPHS:  XR RIGHT SHOULDER 8/28/19 [facility]  IMPRESSION:  Normal right shoulder.  -I have independently reviewed these images during this office visit. -Dr. Good Heads:  Three views - No fractures, no acromioclavicular narrowing, no glenohumeral narrowing, no calcific densities. IMPRESSION:      ICD-10-CM ICD-9-CM    1. Chronic bursitis of right shoulder M75.51 726.10 betamethasone (CELESTONE SOLUSPAN) 6 mg/mL injection      BETAMETHASONE ACETATE & SODIUM PHOSPHATE INJECTION 3 MG EA.      DRAIN/INJECT LARGE JOINT/BURSA      REFERRAL TO PHYSICAL THERAPY      REFERRAL TO PRIMARY CARE   2. Tendinitis of right rotator cuff M75.81 726.10 REFERRAL TO PRIMARY CARE   3. Lupus ([address] M32.9 710.0 REFERRAL TO PRIMARY CARE   4.  Chronic right shoulder pain M25.511 719.41 betamethasone (CELESTONE SOLUSPAN) 6 mg/mL injection    G89.29 338.29 BETAMETHASONE ACETATE & SODIUM PHOSPHATE INJECTION 3 MG EA.      DRAIN/INJECT LARGE JOINT/BURSA      REFERRAL TO PHYSICAL THERAPY      REFERRAL TO PRIMARY CARE     PLAN:  After discussing treatment options, patient's right shoulder was injected with 4 cc Marcaine and 1/2 cc Celestone. She will start a brief course of outpatient physical therapy. She will be referred to a PCP. Consider rheumatology referral if necessary. We discussed a possible need for right shoulder MR arthrogram in the future if pain continues. She will follow up as needed.       Scribed by Andi Castro (Julius Bee) as dictated by Laura Ireland MD

## 2019-09-12 ENCOUNTER — HOSPITAL ENCOUNTER (OUTPATIENT)
Dept: PHYSICAL THERAPY | Age: 39
Discharge: HOME | End: 2019-09-12
Payer: COMMERCIAL

## 2019-09-12 PROCEDURE — 97014 ELECTRIC STIMULATION THERAPY: CPT

## 2019-09-12 PROCEDURE — 97530 THERAPEUTIC ACTIVITIES: CPT

## 2019-09-12 PROCEDURE — 97161 PT EVAL LOW COMPLEX 20 MIN: CPT
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
## 2019-09-12 NOTE — PROGRESS NOTES
PT DAILY TREATMENT NOTE     Patient Name: Brian Arthur  Date:2019  : 1980  [x]  Patient  Verified  Payor: Juice Figueroa / Plan: Paula Cruz / Product Type: HMO /    In time:800  Out time:845  Total Treatment Time (min): 45  Total Timed Codes (min): 10  1:1 Treatment Time ( only): 35   Visit #: 1 of 8    Treatment Area: Pain in right shoulder [M25.511]  Bursitis of right shoulder [M75.51]    SUBJECTIVE  Pain Level (0-10 scale): 8  Any medication changes, allergies to medications, adverse drug reactions, diagnosis change, or new procedure performed?: [x] No    [] Yes (see summary sheet for update)  Subjective functional status:   [x] See Eval form in paper chart      OBJECTIVE       Modality rationale: decrease pain and increase tissue extensibility to improve the patients ability to perform ADLs.     Min Type Additional Details   10 [x] Estim:  [x]Unatt       [x]IFC  []Premod                        []Other:  [x]w/ice   []w/heat  Position:semi-reclined with wedge  Location:right shoulder    [] Estim: []Att    []TENS instruct  []NMES                    []Other:  []w/US   []w/ice   []w/heat  Position:  Location:    []  Traction: [] Cervical       []Lumbar                       [] Prone          []Supine                       []Intermittent   []Continuous Lbs:  [] before manual  [] after manual    []  Ultrasound: []Continuous   [] Pulsed                           []1MHz   []3MHz Location:  W/cm2:    []  Iontophoresis with dexamethasone         Location: [] Take home patch   [] In clinic    []  Ice     []  heat  []  Ice massage  []  Laser   []  Anodyne Position:  Location:    []  Laser with stim  []  Other: Position:  Location:    []  Vasopneumatic Device Pressure:       [] lo [] med [] hi   Temperature: [] lo [] med [] hi   [x] Skin assessment post-treatment:  [x]intact []redness- no adverse reaction    []redness  adverse reaction:       25 min [x]Eval                  []Re-Eval       10 min Therapeutic Activity:  [x]  See flow sheet :HEP and activity modification review   Rationale: increase ROM, increase strength, improve coordination, improve balance and increase proprioception  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 Therapist to establish HEP for strength and ROM to improve ease with ADLs. Long Term Goals: To be accomplished in 4 weeks:  1. Patient will be independent with HEP to improve carryover of functional gains with ADLs between visits. Eval Status:n/a  2. Pt will increase right passive shoulder flexion to 165 degrees to increase ease with ADLs. Eval Status:    Shoulder flexion PROM supine: 135 deg\  3. Pt will increase right active shoulder flexion to 160 degrees to increase ease with ADLs. Eval Status:     Shoulder flexion AROM flexion in standin de. Pt will increase FOTO score to 69 points to demonstrate improved functional lift & carry. Eval Status:FOTO: 46  5. Pt will increase right Shoulder flexion/ abduction/ IR/ER strength to grossly 5/5 in order to improve functional lift & carry.    Eval Status:    Unable to fully assess due to pain/guarding    PLAN      [x]  Continue plan of care    []  Other:Silver Day, PT 2019  8:55 AM

## 2019-09-24 ENCOUNTER — HOSPITAL ENCOUNTER (OUTPATIENT)
Dept: PHYSICAL THERAPY | Age: 39
Discharge: HOME | End: 2019-09-24
Payer: COMMERCIAL

## 2019-09-24 PROCEDURE — 97014 ELECTRIC STIMULATION THERAPY: CPT

## 2019-09-24 PROCEDURE — 97110 THERAPEUTIC EXERCISES: CPT

## 2019-09-24 NOTE — PROGRESS NOTES
PT DAILY TREATMENT NOTE 10-18    Patient Name: Elisa Flor  Date:2019  : 1980  [x]  Patient  Verified  Payor: El Herbert / Plan: Vald Oas / Product Type: HMO /    In time:500  Out time:550  Total Treatment Time (min): 50  Visit #: 2 of 8    Medicare/BCBS Only   Total Timed Codes (min):  40 1:1 Treatment Time:  40       Treatment Area: Pain in right shoulder [M25.511]  Bursitis of right shoulder [M75.51]    SUBJECTIVE  Pain Level (0-10 scale): 8  Any medication changes, allergies to medications, adverse drug reactions, diagnosis change, or new procedure performed?: [x] No    [] Yes (see summary sheet for update)  Subjective functional status/changes:   [] No changes reported  \"I'm very sore. \"    OBJECTIVE    Modality rationale: decrease edema, decrease inflammation and decrease pain to improve the patients ability to improve ease with ADLs.     Min Type Additional Details   10 [x] Estim:  [x]Unatt       [x]IFC  []Premod                        []Other:  [x]w/ice   []w/heat  Position:supine with legs reclined  Location:right shoulder    [] Estim: []Att    []TENS instruct  []NMES                    []Other:  []w/US   []w/ice   []w/heat  Position:  Location:    []  Traction: [] Cervical       []Lumbar                       [] Prone          []Supine                       []Intermittent   []Continuous Lbs:  [] before manual  [] after manual    []  Ultrasound: []Continuous   [] Pulsed                           []1MHz   []3MHz W/cm2:  Location:    []  Iontophoresis with dexamethasone         Location: [] Take home patch   [] In clinic    []  Ice     []  heat  []  Ice massage  []  Laser   []  Anodyne Position:  Location:    []  Laser with stim  []  Other:  Position:  Location:    []  Vasopneumatic Device Pressure:       [] lo [] med [] hi   Temperature: [] lo [] med [] hi   [x] Skin assessment post-treatment:  [x]intact []redness- no adverse reaction    []redness  adverse reaction:         40 min Therapeutic Exercise:  [x] See flow sheet :   Rationale: increase ROM, increase strength, improve coordination, improve balance and increase proprioception to improve the patients ability to perform ADLs/ reach. With   [] TE   [] TA   [] neuro   [] other: Patient Education: [x] Review HEP    [] Progressed/Changed HEP based on:   [] positioning   [] body mechanics   [] transfers   [] heat/ice application    [] other:      Other Objective/Functional Measures:      Pain Level (0-10 scale) post treatment: 4    ASSESSMENT/Changes in Function: patient did well with initiation of exercises without increase in pain. Patient will continue to benefit from skilled PT services to modify and progress therapeutic interventions, address functional mobility deficits, address ROM deficits, address strength deficits, analyze and address soft tissue restrictions, analyze and cue movement patterns, analyze and modify body mechanics/ergonomics, assess and modify postural abnormalities, address imbalance/dizziness and instruct in home and community integration to attain remaining goals. []  See Plan of Care  []  See progress note/recertification  []  See Discharge Summary         Progress towards goals / Updated goals:  Short Term Goals: To be accomplished in 1 weeks:  1. Therapist to establish HEP for strength and ROM to improve ease with ADLs.  MET  Long Term Goals: To be accomplished in 4 weeks:  1. Patient will be independent with HEP to improve carryover of functional gains with ADLs between visits. Eval Status:n/a  2. Pt will increase right passive shoulder flexion to 165 degrees to increase ease with ADLs. Eval Status:              Shoulder flexion PROM supine: 135 deg\  3. Pt will increase right active shoulder flexion to 160 degrees to increase ease with ADLs. Eval Status:               Shoulder flexion AROM flexion in standin de.  Pt will increase FOTO score to 69 points to demonstrate improved functional lift & carry. Eval Status:FOTO: 46  5. Pt will increase right Shoulder flexion/ abduction/ IR/ER strength to grossly 5/5 in order to improve functional lift & carry.               Eval Status:              Unable to fully assess due to pain/guarding       PLAN  []  Upgrade activities as tolerated     [x]  Continue plan of care  []  Update interventions per flow sheet       []  Discharge due to:_  []  Other:_      Aileen Mark, PT 9/24/2019  5:46 PM    Future Appointments   Date Time Provider Ramy Olvera   10/8/2019  5:00 PM [facility]   10/10/2019  4:30 PM [facility]

## 2019-10-01 ENCOUNTER — APPOINTMENT (OUTPATIENT)
Dept: PHYSICAL THERAPY | Age: 39
End: 2019-10-01
Payer: COMMERCIAL

## 2019-10-01 ENCOUNTER — OFFICE VISIT (OUTPATIENT)
Dept: ORTHOPEDIC SURGERY | Age: 39
End: 2019-10-01

## 2019-10-01 ENCOUNTER — HOSPITAL ENCOUNTER (OUTPATIENT)
Dept: PHYSICAL THERAPY | Age: 39
Discharge: HOME | End: 2019-10-01
Payer: COMMERCIAL

## 2019-10-01 VITALS
TEMPERATURE: 97.9 F | DIASTOLIC BLOOD PRESSURE: 91 MMHG | HEIGHT: 65 IN | HEART RATE: 61 BPM | SYSTOLIC BLOOD PRESSURE: 134 MMHG | BODY MASS INDEX: 32.78 KG/M2 | RESPIRATION RATE: 18 BRPM

## 2019-10-01 DIAGNOSIS — M25.511 CHRONIC RIGHT SHOULDER PAIN: ICD-10-CM

## 2019-10-01 DIAGNOSIS — S13.4XXA WHIPLASH INJURY TO NECK, INITIAL ENCOUNTER: ICD-10-CM

## 2019-10-01 DIAGNOSIS — S39.012A STRAIN OF LUMBAR REGION, INITIAL ENCOUNTER: ICD-10-CM

## 2019-10-01 DIAGNOSIS — S06.0X0A CONCUSSION WITHOUT LOSS OF CONSCIOUSNESS, INITIAL ENCOUNTER: Primary | ICD-10-CM
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
## 2019-10-01 DIAGNOSIS — G89.29 CHRONIC RIGHT SHOULDER PAIN: ICD-10-CM

## 2019-10-01 PROCEDURE — 97110 THERAPEUTIC EXERCISES: CPT

## 2019-10-01 PROCEDURE — 97014 ELECTRIC STIMULATION THERAPY: CPT

## 2019-10-01 RX ORDER — PREDNISONE 20 MG/1
TABLET ORAL
Qty: 20 TAB | Refills: 0 | Status: SHIPPED | OUTPATIENT
Start: 2019-10-01 | End: 2019-11-14 | Stop reason: ALTCHOICE

## 2019-10-01 RX ORDER — KETOROLAC TROMETHAMINE 15 MG/ML
60 INJECTION, SOLUTION INTRAMUSCULAR; INTRAVENOUS ONCE
Qty: 1 VIAL | Refills: 0
Start: 2019-10-01 | End: 2019-10-01

## 2019-10-01 RX ORDER — IBUPROFEN 800 MG/1
800 TABLET ORAL AS NEEDED
COMMUNITY
Start: 2019-09-23 | End: 2019-11-14

## 2019-10-01 RX ORDER — CYCLOBENZAPRINE HCL 10 MG
10 TABLET ORAL AS NEEDED
COMMUNITY
Start: 2019-09-11 | End: 2019-11-14

## 2019-10-01 RX ORDER — NAPROXEN 500 MG/1
500 TABLET ORAL 2 TIMES DAILY WITH MEALS
COMMUNITY
End: 2019-11-14 | Stop reason: SDUPTHER
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
## 2019-10-01 NOTE — PROGRESS NOTES
Physical Therapy Discharge Instructions      [facility] [place] [address]  [address] [facility] [address], [address] [phone] fax      Patient:  James Martin  : 1980      Continue Home Exercise Program 1-2 times per day for 3-5 weeks, then decrease to 3 times per week      Continue with    [x] Ice  as needed 2-3 times per day     [] Heat           Follow up with MD:     [] Upon completion of therapy     [x] As needed      Recommendations:     [x]   Return to activity with home program    []   Return to activity with the following modifications:       []Post Rehab Program    []Join Independent aquatic program     []Return to/join local gym    Marisol Quinn PTA, CSCS   10/1/2019 5:08 PM

## 2019-10-01 NOTE — PROGRESS NOTES
PT DAILY TREATMENT NOTE 10-18    Patient Name: Sara Meraz  Date:10/1/2019  : 1980  [x]  Patient  Verified  Payor: Austin Wells / Plan: Natalia Ramirez / Product Type: HMO /    In time:437  Out time:520  Total Treatment Time (min): 43  Visit #: 3 of 8    Medicare/BCBS Only   Total Timed Codes (min):  33 1:1 Treatment Time:  33       Treatment Area: Pain in right shoulder [M25.511]  Bursitis of right shoulder [M75.51]    SUBJECTIVE  Pain Level (0-10 scale): 6  Any medication changes, allergies to medications, adverse drug reactions, diagnosis change, or new procedure performed?: [x] No    [] Yes (see summary sheet for update)  Subjective functional status/changes:   [] No changes reported  \"I'm going to finish up my shoulder today and start my new script. \"    OBJECTIVE    Modality rationale: decrease inflammation, decrease pain and increase tissue extensibility to improve the patients ability to improve mobility and reaching   Min Type Additional Details   10 [x] Estim:  [x]Unatt       [x]IFC  []Premod                        []Other:  [x]w/ice   []w/heat  Position: seated   Location: right shoulder    [] Estim: []Att    []TENS instruct  []NMES                    []Other:  []w/US   []w/ice   []w/heat  Position:  Location:    []  Traction: [] Cervical       []Lumbar                       [] Prone          []Supine                       []Intermittent   []Continuous Lbs:  [] before manual  [] after manual    []  Ultrasound: []Continuous   [] Pulsed                           []1MHz   []3MHz W/cm2:  Location:    []  Iontophoresis with dexamethasone         Location: [] Take home patch   [] In clinic    []  Ice     []  heat  []  Ice massage  []  Laser   []  Anodyne Position:  Location:    []  Laser with stim  []  Other:  Position:  Location:    []  Vasopneumatic Device Pressure:       [] lo [] med [] hi   Temperature: [] lo [] med [] hi   [x] Skin assessment post-treatment:  [x]intact []redness- no adverse reaction    []redness  adverse reaction:     33 min Therapeutic Exercise:  [x] See flow sheet :   Rationale: increase ROM and increase strength to improve the patients ability to perform ADLs         With   [x] TE   [] TA   [] neuro   [] other: Patient Education: [x] Review HEP    [] Progressed/Changed HEP based on:   [x] positioning   [x] body mechanics   [] transfers   [] heat/ice application    [] other:      Other Objective/Functional Measures:   FOTO 64    AROM right shoulder flexion 155 deg  PROM right shoulder flexion 155 deg     Pain Level (0-10 scale) post treatment: 2    ASSESSMENT/Changes in Function: Ms. Charlotte Puente has been a pleasure to treat and reports 80% improvement since beginning therapy. Pt reports improvements with her work duties, getting out of bed, and overall mobility. She demonstrates improved ROM, but reports continued pain/discomfort at end ranges. Her strength has improved, but not to full strength. She reports continued difficulty with lifting, pulling a shirt over her head, and tying shoes. We are discharging as pt wishes to begin her script for another body part. []  See Plan of Care  []  See progress note/recertification  [x]  See Discharge Summary         Progress towards goals / Updated goals:  Short Term Goals: To be accomplished in 1 weeks:  1. Therapist to establish HEP for strength and ROM to improve ease with ADLs.   MET  Long Term Goals: To be accomplished in 4 weeks:  1. Patient will be independent with HEP to improve carryover of functional gains with ADLs between visits.             Eval Status:n/a    MET  2. Pt will increase right passive shoulder flexion to 165 degrees to increase ease with ADLs.             Eval Status: Shoulder flexion PROM supine: 135 deg   NOT MET; 155 deg  3.  Pt will increase right active shoulder flexion to 160 degrees to increase ease with ADLs.               Eval Status:  Shoulder flexion AROM flexion in standin deg:   NOT MET; 155 deg AROM right shoulder flexion    4. Pt will increase FOTO score to 69 points to demonstrate improved functional lift & carry.               Eval Status:FOTO: 46   NOT MET; 64  5.  Pt will increase right Shoulder flexion/ abduction/ IR/ER strength to grossly 5/5 in order to improve functional lift & carry.  Juan Rima Status: Unable to fully assess due to pain/guarding              NOT MET; 4/5 grossly right shoulder flexion/abduction/IR/ER    Functional Gains: work duties, getting out of bed, mobility  Functional Deficits: reaching, pain, tying shoes, lifting, dressing,   % improvement: 80%  Pain   Average: 6/10       Best: 4/10     Worst: 8/10  Patient Goal: \"to be 100%\"    PLAN  []  Upgrade activities as tolerated     []  Continue plan of care  []  Update interventions per flow sheet       [x]  Discharge due to: SELF  []  Other:_      Inez Gomez PTA, [facility] 10/1/2019  5:21 PM    Future Appointments   Date Time Provider Ramy Olvera   10/3/2019  4:30 PM Minus ERIC [facility]   10/8/2019  4:00 PM [facility]   10/8/2019  5:00 PM Minus ERIC Leroy [facility]   10/10/2019  4:30 PM [facility]us [facility]   11/14/2019 10:15 AM Aniya Garcia  [id number]

## 2019-10-01 NOTE — PATIENT INSTRUCTIONS
Back Stretches: Exercises  Introduction  Here are some examples of exercises for stretching your back. Start each exercise slowly. Ease off the exercise if you start to have pain. Your doctor or physical therapist will tell you when you can start these exercises and which ones will work best for you. How to do the exercises  Overhead stretch    1. Stand comfortably with your feet shoulder-width apart. 2. Looking straight ahead, raise both arms over your head and reach toward the ceiling. Do not allow your head to tilt back. 3. Hold for 15 to 30 seconds, then lower your arms to your sides. 4. Repeat 2 to 4 times. Side stretch    1. Stand comfortably with your feet shoulder-width apart. 2. Raise one arm over your head, and then lean to the other side. 3. Slide your hand down your leg as you let the weight of your arm gently stretch your side muscles. Hold for 15 to 30 seconds. 4. Repeat 2 to 4 times on each side. Press-up    1. Lie on your stomach, supporting your body with your forearms. 2. Press your elbows down into the floor to raise your upper back. As you do this, relax your stomach muscles and allow your back to arch without using your back muscles. As your press up, do not let your hips or pelvis come off the floor. 3. Hold for 15 to 30 seconds, then relax. 4. Repeat 2 to 4 times. Relax and rest    1. Lie on your back with a rolled towel under your neck and a pillow under your knees. Extend your arms comfortably to your sides. 2. Relax and breathe normally. 3. Remain in this position for about 10 minutes. 4. If you can, do this 2 or 3 times each day. Follow-up care is a key part of your treatment and safety. Be sure to make and go to all appointments, and call your doctor if you are having problems. It's also a good idea to know your test results and keep a list of the medicines you take. Where can you learn more? Go to http://leila-giles.info/.   Enter Z983 in the search box to learn more about \"Back Stretches: Exercises. \"  Current as of: June 26, 2019  Content Version: 12.2  © 1845-6491 [employer]. Care instructions adapted under license by [employer] (which disclaims liability or warranty for this information). If you have questions about a medical condition or this instruction, always ask your healthcare professional. [address] any warranty or liability for your use of this information. Neck Spasm: Exercises  Introduction  Here are some examples of exercises for you to try. The exercises may be suggested for a condition or for rehabilitation. Start each exercise slowly. Ease off the exercises if you start to have pain. You will be told when to start these exercises and which ones will work best for you. How to do the exercises  Levator scapula stretch    1. Sit in a firm chair, or stand up straight. 2. Gently tilt your head toward your left shoulder. 3. Turn your head to look down into your armpit, bending your head slightly forward. Let the weight of your head stretch your neck muscles. 4. Hold for 15 to 30 seconds. 5. Return to your starting position. 6. Follow the same instructions above, but tilt your head toward your right shoulder. 7. Repeat 2 to 4 times toward each shoulder. Upper trapezius stretch    1. Sit in a firm chair, or stand up straight. 2. This stretch works best if you keep your shoulder down as you lean away from it. To help you remember to do this, start by relaxing your shoulders and lightly holding on to your thighs or your chair. 3. Tilt your head toward your shoulder and hold for 15 to 30 seconds. Let the weight of your head stretch your muscles. 4. If you would like a little added stretch, place your arm behind your back. Use the arm opposite of the direction you are tilting your head.  For example, if you are tilting your head to the left, place your right arm behind your back. 5. Repeat 2 to 4 times toward each shoulder. Neck rotation    1. Sit in a firm chair, or stand up straight. 2. Keeping your chin level, turn your head to the right, and hold for 15 to 30 seconds. 3. Turn your head to the left, and hold for 15 to 30 seconds. 4. Repeat 2 to 4 times to each side. Chin tuck    1. Lie on the floor with a rolled-up towel under your neck. Your head should be touching the floor. 2. Slowly bring your chin toward the front of your neck. 3. Hold for a count of 6, and then relax for up to 10 seconds. 4. Repeat 8 to 12 times. Forward neck flexion    1. Sit in a firm chair, or stand up straight. 2. Bend your head forward. 3. Hold for 15 to 30 seconds, then return to your starting position. 4. Repeat 2 to 4 times. Follow-up care is a key part of your treatment and safety. Be sure to make and go to all appointments, and call your doctor if you are having problems. It's also a good idea to know your test results and keep a list of the medicines you take. Where can you learn more? Go to http://leila-giles.info/. Enter P962 in the search box to learn more about \"Neck Spasm: Exercises. \"  Current as of: June 26, 2019  Content Version: 12.2  © 5010-0368 [employer]. Care instructions adapted under license by [employer] (which disclaims liability or warranty for this information). If you have questions about a medical condition or this instruction, always ask your healthcare professional. [place] [address] any warranty or liability for your use of this information.

## 2019-10-01 NOTE — LETTER
NOTIFICATION RETURN TO WORK / SCHOOL 
 
10/1/2019 1:44 PM 
 
Ms. Eli Wooten [address] [facility] [place] [address] To Whom It May Concern: Eli Wooten is currently under the care of [address] [facility]. She is to remain on Light Duty w/ no lifting greater than 20lbs, no overhead activity, repeated bending or twisting. If there are questions or concerns please have the patient contact our office. Sincerely, Kenneth Rodriguez NP

## 2019-10-02 NOTE — PROGRESS NOTES
[facility] [address]  [address] [place], [address] [phone] fax    Discharge Summary  Patient name: Evon Essex Start of Care: 2019   Referral source: Burke Alfaro MD : 1980                Medical Diagnosis: Pain in right shoulder [M25.511]  Bursitis of right shoulder [M75.51]  Payor: Bruce Majano / Plan: Miriam Mcnally / Product Type: HMO /  Onset Date:19                Treatment Diagnosis: right shoulder pain   Prior Hospitalization: see medical history Provider#: [id number]   Medications: Verified on Patient summary List    Comorbidities: lupus   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 Therapist to establish HEP for strength and ROM to improve ease with ADLs.             MET  Long Term Goals: To be accomplished in 4 weeks:  1. Patient will be independent with HEP to improve carryover of functional gains with ADLs between visits.             Eval Status:n/a              MET  2. Pt will increase right passive shoulder flexion to 165 degrees to increase ease with ADLs.             Eval Status: Shoulder flexion PROM supine: 135 deg              NOT MET; 155 deg  3.  Pt will increase right active shoulder flexion to 160 degrees to increase ease with ADLs.             Eval Status:  Shoulder flexion AROM flexion in standin deg:              NOT MET; 155 deg AROM right shoulder flexion    4. Pt will increase FOTO score to 69 points to demonstrate improved functional lift & carry.               Eval Status:FOTO: 46              NOT MET; 64  5.  Pt will increase right Shoulder flexion/ abduction/ IR/ER strength to grossly 5/5 in order to improve functional lift & carry.  Elizabeth Oconnor Status: Unable to fully assess due to pain/guarding              NOT MET; 4/5 grossly right shoulder flexion/abduction/IR/ER     Functional Gains: work duties, getting out of bed, mobility  Functional Deficits: reaching, pain, tying shoes, lifting, dressing,   % improvement: 80%  Pain   Average: 6/10                  Best: 4/10                Worst: 8/10  Patient Goal: \"to be 100%\"       Assessment/Summary of care: Ms. Patricio Pham has been a pleasure to treat and reports 80% improvement since beginning therapy. Pt reports improvements with her work duties, getting out of bed, and overall mobility. She demonstrates improved ROM, but reports continued pain/discomfort at end ranges. Her strength has improved, but not to full strength. She reports continued difficulty with lifting, pulling a shirt over her head, and tying shoes. We are discharging as pt wishes to begin her script for another body part.     RECOMMENDATIONS:  [x]Discontinue therapy: [x]Patient has reached or is progressing toward set goals      []Patient is non-compliant or has abdicated      []Due to lack of appreciable progress towards set [address], PT 10/2/2019 5:24 PM

## 2019-10-03 ENCOUNTER — APPOINTMENT (OUTPATIENT)
Dept: PHYSICAL THERAPY | Age: 39
End: 2019-10-03
Payer: COMMERCIAL

## 2019-10-08 ENCOUNTER — HOSPITAL ENCOUNTER (OUTPATIENT)
Dept: CT IMAGING | Age: 39
Discharge: HOME | End: 2019-10-08
Attending: NURSE PRACTITIONER
Payer: COMMERCIAL

## 2019-10-08 ENCOUNTER — HOSPITAL ENCOUNTER (OUTPATIENT)
Dept: PHYSICAL THERAPY | Age: 39
Discharge: HOME | End: 2019-10-08
Payer: COMMERCIAL

## 2019-10-08 ENCOUNTER — APPOINTMENT (OUTPATIENT)
Dept: PHYSICAL THERAPY | Age: 39
End: 2019-10-08
Payer: COMMERCIAL

## 2019-10-08 DIAGNOSIS — S06.0X0A CONCUSSION WITHOUT LOSS OF CONSCIOUSNESS, INITIAL ENCOUNTER: ICD-10-CM

## 2019-10-08 PROCEDURE — 97530 THERAPEUTIC ACTIVITIES: CPT

## 2019-10-08 PROCEDURE — 70450 CT HEAD/BRAIN W/O DYE: CPT

## 2019-10-08 PROCEDURE — 97161 PT EVAL LOW COMPLEX 20 MIN: CPT
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
## 2019-10-08 NOTE — PROGRESS NOTES
PT DAILY TREATMENT NOTE14    Patient Name: Liane Stokes  Date:10/8/2019  : 1980  [x]  Patient  Verified  Payor: Humberto Adams / Plan: [address] / Product Type: NAT /    In time:405  Out time:443  Total Treatment Time (min): 38  Total Timed Codes (min): 23  1:1 Treatment Time (MC only): 38   Visit #: 1 of 8    Treatment Area: Pain in neck [M54.2]  Low back pain [M54.5]  Pain in right shoulder [M25.511]    SUBJECTIVE  Pain Level (0-10 scale): 8  Any medication changes, allergies to medications, adverse drug reactions, diagnosis change, or new procedure performed?: [x] No    [] Yes (see summary sheet for update)  Subjective functional status:   [x] See Eval form in paper chart      OBJECTIVE    14 min [x]Eval                  []Re-Eval       23 min Therapeutic Activity:  [x]  See flow sheet :HEP, activity modification review   Rationale: increase ROM, increase strength, improve coordination, improve balance and increase proprioception  to improve the patients ability to improve ROM for ADLs. With   [] TE   [] TA   [] neuro   [] other: Patient Education: [x] Review HEP    [] Progressed/Changed HEP based on:   [] positioning   [] body mechanics   [] transfers   [] heat/ice application    [] other:                  Pain Level (0-10 scale) post treatment: 8    ASSESSMENT:   [x]  See Evaluation         Goals:  Short Term Goals: To be accomplished in 1 weeks:  1. Therapist to establish HEP for strength and ROM to improve ease with ADLs. Long Term Goals: To be accomplished in 4 weeks:  1. Patient will be independent with HEP to improve carryover of functional gains with ADLs between visits. Eval Status:n/a  2. Pt will report decrease in average pain rating on VAS to <5/10 to improve ease with daily tasks. Eval Status:8/10  3. . Pt will increase FOTO score to 64 points to demostrate improved function. Eval Status: FOTO: 44  4.    . Pt will increase right Shoulder flexion/ abduction/strength to grossly 5/5 in order to improve functional lift & carry.    Eval Status:    Shoulder flexion: 3+/5   Shoulder Abduction: 4+/5    PLAN      [x]  Continue plan of care    []  Other:_      Waqar Brewer, PT 10/8/2019  4:52 PM

## 2019-10-10 ENCOUNTER — APPOINTMENT (OUTPATIENT)
Dept: PHYSICAL THERAPY | Age: 39
End: 2019-10-10
Payer: COMMERCIAL

## 2019-10-15 ENCOUNTER — HOSPITAL ENCOUNTER (OUTPATIENT)
Dept: PHYSICAL THERAPY | Age: 39
Discharge: HOME | End: 2019-10-15
Payer: COMMERCIAL

## 2019-10-15 PROCEDURE — 97140 MANUAL THERAPY 1/> REGIONS: CPT

## 2019-10-15 PROCEDURE — 97110 THERAPEUTIC EXERCISES: CPT

## 2019-10-15 PROCEDURE — 97014 ELECTRIC STIMULATION THERAPY: CPT

## 2019-10-15 PROCEDURE — 97112 NEUROMUSCULAR REEDUCATION: CPT

## 2019-10-15 NOTE — PROGRESS NOTES
PT DAILY TREATMENT NOTE 10-18    Patient Name: Kenia Grove  Date:10/15/2019  : 1980  [x]  Patient  Verified  Payor: Salas Earing / Plan: Christian 97 / Product Type: NAT /    In time:505  Out time:555  Total Treatment Time (min): 50  Visit #: 2 of 8    Medicare/BCBS Only   Total Timed Codes (min):  40 1:1 Treatment Time:  40       Treatment Area: Pain in neck [M54.2]  Low back pain [M54.5]  Pain in right shoulder [M25.511]    SUBJECTIVE  Pain Level (0-10 scale): 6  Any medication changes, allergies to medications, adverse drug reactions, diagnosis change, or new procedure performed?: [x] No    [] Yes (see summary sheet for update)  Subjective functional status/changes:   [] No changes reported  \"I don't feel as tight today. \"    OBJECTIVE    Modality rationale: decrease pain and increase tissue extensibility to improve the patients ability to improve mobility and positional tolerance   Min Type Additional Details   10 [x] Estim:  [x]Unatt       [x]IFC  []Premod                        []Other:  []w/ice   [x]w/heat  Position: supine with wedge   Location: lower back    [] Estim: []Att    []TENS instruct  []NMES                    []Other:  []w/US   []w/ice   []w/heat  Position:  Location:    []  Traction: [] Cervical       []Lumbar                       [] Prone          []Supine                       []Intermittent   []Continuous Lbs:  [] before manual  [] after manual    []  Ultrasound: []Continuous   [] Pulsed                           []1MHz   []3MHz W/cm2:  Location:    []  Iontophoresis with dexamethasone         Location: [] Take home patch   [] In clinic    []  Ice     []  heat  []  Ice massage  []  Laser   []  Anodyne Position:  Location:    []  Laser with stim  []  Other:  Position:  Location:    []  Vasopneumatic Device Pressure:       [] lo [] med [] hi   Temperature: [] lo [] med [] hi   [x] Skin assessment post-treatment:  [x]intact []redness- no adverse reaction    []redness  adverse reaction:     10 min Therapeutic Exercise:  [x] See flow sheet :   Rationale: increase ROM and increase strength to improve the patients ability to perform ADLs    22 min Neuromuscular Re-education:  [x]  See flow sheet : postural re-ed activities   Rationale: increase ROM, increase strength, improve coordination, improve balance and increase proprioception  to improve the patients ability to improve mobility and upright posture    8 min Manual Therapy:  SOR, STM to c/s paraspinals and B UT   Rationale: decrease pain, increase ROM, increase tissue extensibility, decrease trigger points and increase postural awareness to improve mobility and upright posture         With   [x] TE   [] TA   [x] neuro   [] other: Patient Education: [x] Review HEP    [] Progressed/Changed HEP based on:   [x] positioning   [x] body mechanics   [] transfers   [] heat/ice application    [] other:      Other Objective/Functional Measures:      Pain Level (0-10 scale) post treatment: 4    ASSESSMENT/Changes in Function: Initiated POC to which pt responded well. Pt reports having decreased tightness today compared to her eval. Reports compliance with her HEP. Challenged with median and ulnar nerve glides secondary to nerve tension. Pt reports relief of pain following manual and modalities. Patient will continue to benefit from skilled PT services to modify and progress therapeutic interventions, address functional mobility deficits, address ROM deficits, address strength deficits, analyze and address soft tissue restrictions, analyze and cue movement patterns, analyze and modify body mechanics/ergonomics, assess and modify postural abnormalities, address imbalance/dizziness and instruct in home and community integration to attain remaining goals. [x]  See Plan of Care  []  See progress note/recertification  []  See Discharge Summary         Progress towards goals / Updated goals:  Short Term Goals: To be accomplished in 1 weeks:  1. Therapist to establish HEP for strength and ROM to improve ease with ADLs.   MET     Long Term Goals: To be accomplished in 4 weeks:  1. Patient will be independent with HEP to improve carryover of functional gains with ADLs between visits. Eval Status:n/a    Reports compliance  2. Pt will report decrease in average pain rating on VAS to <5/10 to improve ease with daily tasks. Eval Status:8/10  3. . Pt will increase FOTO score to 64 points to demostrate improved function. Eval Status: FOTO: 44  4. . Pt will increase right Shoulder flexion/ abduction/strength to grossly 5/5 in order to improve functional lift & carry.               Eval Status:              Shoulder flexion: 3+/5              Shoulder Abduction: 4+/5    PLAN  []  Upgrade activities as tolerated     [x]  Continue plan of care  []  Update interventions per flow sheet       []  Discharge due to:_  []  Other:_      Ronald Arcehiga PTA, [facility] 10/15/2019  5:55 PM    Future Appointments   Date Time Provider Ramy Olvera   10/17/2019  3:30 PM Bert Cortez [facility]   10/22/2019  3:30 PM [facility]   10/24/2019  4:30 PM [facility]   10/29/2019  3:30 PM [facility] Garcia [facility]   10/31/2019  5:00 PM [facility]le [facility]   11/14/2019 10:15 AM Ben Pastor  [address]

## 2019-10-17 ENCOUNTER — HOSPITAL ENCOUNTER (OUTPATIENT)
Dept: PHYSICAL THERAPY | Age: 39
Discharge: HOME | End: 2019-10-17
Payer: COMMERCIAL

## 2019-10-17 PROCEDURE — 97140 MANUAL THERAPY 1/> REGIONS: CPT

## 2019-10-17 PROCEDURE — 97110 THERAPEUTIC EXERCISES: CPT

## 2019-10-17 PROCEDURE — 97014 ELECTRIC STIMULATION THERAPY: CPT

## 2019-10-17 NOTE — PROGRESS NOTES
PT DAILY TREATMENT NOTE 10-18    Patient Name: Suki William  Date:10/17/2019  : 1980  [x]  Patient  Verified  Payor: Eber Vincent / Plan: Christian 97 / Product Type: NAT /    In time:336  Out time:428  Total Treatment Time (min): 52  Visit #: 3 of 8    Treatment Area: Pain in neck [M54.2]  Low back pain [M54.5]  Pain in right shoulder [M25.511]    SUBJECTIVE  Pain Level (0-10 scale): 7  Any medication changes, allergies to medications, adverse drug reactions, diagnosis change, or new procedure performed?: [x] No    [] Yes (see summary sheet for update)  Subjective functional status/changes:   [] No changes reported  Pt reports increased pain today \"possibly because of the cold. \" Pt also state that she thinks she needs a new pillow because her neck has been bothering her more and she is having trouble sleeping.      OBJECTIVE    Modality rationale: decrease pain and increase tissue extensibility to improve the patients frequency of headaches   Min Type Additional Details   10 [x] Estim:  [x]Unatt       [x]IFC  []Premod                        []Other:  []w/ice   [x]w/heat  Position: seated  Location: cervical spine    [] Estim: []Att    []TENS instruct  []NMES                    []Other:  []w/US   []w/ice   []w/heat  Position:  Location:    []  Traction: [] Cervical       []Lumbar                       [] Prone          []Supine                       []Intermittent   []Continuous Lbs:  [] before manual  [] after manual    []  Ultrasound: []Continuous   [] Pulsed                           []1MHz   []3MHz W/cm2:  Location:    []  Iontophoresis with dexamethasone         Location: [] Take home patch   [] In clinic    []  Ice     []  heat  []  Ice massage  []  Laser   []  Anodyne Position:  Location:    []  Laser with stim  []  Other:  Position:  Location:    []  Vasopneumatic Device Pressure:       [] lo [] med [] hi   Temperature: [] lo [] med [] hi   [] Skin assessment post-treatment:  [x]intact []redness- no adverse reaction    []redness  adverse reaction:     32 min Therapeutic Exercise:  [x] See flow sheet :   Rationale: increase ROM and increase strength to improve the patients ability to perform ADLs    10 min Manual Therapy:  Soft tissue mobilization with trigger point release to bilateral upper traps, SCM, cervical paraspinals; SOR and instruction to pt's daughter on SOR for home soft tissue mobilization     Pt provided informed consent for all manual therapy     Rationale: decrease pain, increase ROM, increase tissue extensibility and decrease trigger points to improve pt's ability to sleep           With   [] TE   [] TA   [] neuro   [] other: Patient Education: [x] Review HEP    [] Progressed/Changed HEP based on:   [] positioning   [] body mechanics   [] transfers   [] heat/ice application    [] other:      Other Objective/Functional Measures:      Pain Level (0-10 scale) post treatment: 4    ASSESSMENT/Changes in Function: Pt responds positively to manual therapy and IFC with reduction in pain. Pt requiring verbal cues to prevent cervical flexion during cervical retraction. Patient will continue to benefit from skilled PT services to modify and progress therapeutic interventions, address functional mobility deficits, address ROM deficits, address strength deficits, analyze and address soft tissue restrictions, analyze and cue movement patterns, analyze and modify body mechanics/ergonomics and assess and modify postural abnormalities to attain remaining goals. []  See Plan of Care  []  See progress note/recertification  []  See Discharge Summary         Progress towards goals / Updated goals:  Short Term Goals: To be accomplished in 1 weeks:  1. Therapist to establish HEP for strength and ROM to improve ease with ADLs.             MET     Long Term Goals: To be accomplished in 4 weeks:  1.  Patient will be independent with HEP to improve carryover of functional gains with ADLs between visits.                Eval Status:n/a              Reports compliance  2.  Pt will report decrease in average pain rating on VAS to <5/10 to improve ease with daily tasks.              Eval Status:8/10   Progressing, ranging from 4-7/10 over the last 2 visits  3. . Pt will increase FOTO score to 64 points to demostrate improved function.              Eval Status: FOTO: 44   To be assessed at PN  4.   . Pt will increase right Shoulder flexion/ abduction/strength to grossly 5/5 in order to improve functional lift & carry.              Eval Status:              Shoulder flexion: 3+/5              Shoulder Abduction: 4+/5   Too early to appreciate gains    PLAN  [x]  Upgrade activities as tolerated     [x]  Continue plan of care  []  Update interventions per flow sheet       []  Discharge due to:_  []  Other:_      Michelle Kennedy, PT 10/17/2019  3:43 PM    Future Appointments   Date Time Provider Ramy Olvera   10/22/2019  3:30 PM Cee Sue [facility]   10/24/2019  4:30 PM Merle Bee PT [facility]   10/29/2019  3:30 PM Anthony Garcia PT [facility]   10/31/2019  5:00 PM Merle Bee PT [facility]   11/14/2019 10:15 AM Ben Pastor  [address]

## 2019-10-22 ENCOUNTER — HOSPITAL ENCOUNTER (OUTPATIENT)
Dept: PHYSICAL THERAPY | Age: 39
Discharge: HOME | End: 2019-10-22
Payer: COMMERCIAL

## 2019-10-22 PROCEDURE — 97014 ELECTRIC STIMULATION THERAPY: CPT

## 2019-10-22 PROCEDURE — 97112 NEUROMUSCULAR REEDUCATION: CPT

## 2019-10-22 PROCEDURE — 97110 THERAPEUTIC EXERCISES: CPT

## 2019-10-22 PROCEDURE — 97140 MANUAL THERAPY 1/> REGIONS: CPT

## 2019-10-22 NOTE — PROGRESS NOTES
PT DAILY TREATMENT NOTE 10-18    Patient Name: Sami Baker  Date:10/22/2019  : 1980  [x]  Patient  Verified  Payor: Brandon Houston / Plan: Jean Carlosjsaide 97 / Product Type: NAT /    In time:458  Out time:547  Total Treatment Time (min): 49  Visit #: 4 of 8    Medicare/BCBS Only   Total Timed Codes (min):  39 1:1 Treatment Time:  39       Treatment Area: Pain in neck [M54.2]  Low back pain [M54.5]  Pain in right shoulder [M25.511]    SUBJECTIVE  Pain Level (0-10 scale): 7-8  Any medication changes, allergies to medications, adverse drug reactions, diagnosis change, or new procedure performed?: [x] No    [] Yes (see summary sheet for update)  Subjective functional status/changes:   [] No changes reported  \"I think it's the way I'm sleeping and the weather hurting me today. \"    OBJECTIVE    Modality rationale: decrease pain and increase tissue extensibility to improve the patients ability to improve mobility and positional tolerance   Min Type Additional Details   10 [x] Estim:  [x]Unatt       [x]IFC  []Premod                        []Other:  []w/ice   [x]w/heat  Position: supine with wedge   Location: neck    [] Estim: []Att    []TENS instruct  []NMES                    []Other:  []w/US   []w/ice   []w/heat  Position:  Location:    []  Traction: [] Cervical       []Lumbar                       [] Prone          []Supine                       []Intermittent   []Continuous Lbs:  [] before manual  [] after manual    []  Ultrasound: []Continuous   [] Pulsed                           []1MHz   []3MHz W/cm2:  Location:    []  Iontophoresis with dexamethasone         Location: [] Take home patch   [] In clinic   10 during estim []  Ice     [x]  heat  []  Ice massage  []  Laser   []  Anodyne Position: supine with wedge   Location: lower back    []  Laser with stim  []  Other:  Position:  Location:    []  Vasopneumatic Device Pressure:       [] lo [] med [] hi   Temperature: [] lo [] med [] hi   [x] Skin assessment post-treatment:  [x]intact []redness- no adverse reaction    []redness  adverse reaction:     10 min Therapeutic Exercise:  [x] See flow sheet :   Rationale: increase ROM and increase strength to improve the patients ability to perform ADLs     21 min Neuromuscular Re-education:  [x]  See flow sheet : postural re-ed activities   Rationale: increase ROM, increase strength, improve coordination, improve balance and increase proprioception  to improve the patients ability to improve mobility and upright posture    8 min Manual Therapy:  SOR, STM to c/s paraspinals and B UT, gentle cervical traction   Rationale: decrease pain, increase ROM, increase tissue extensibility, decrease trigger points and increase postural awareness to improve mobility and positional tolerance        With   [x] TE   [] TA   [x] neuro   [] other: Patient Education: [x] Review HEP    [] Progressed/Changed HEP based on:   [x] positioning   [x] body mechanics   [] transfers   [] heat/ice application    [] other:      Other Objective/Functional Measures:      Pain Level (0-10 scale) post treatment: 5    ASSESSMENT/Changes in Function: Pt is making slow progress with her pain relief. She was tender at right c/s paraspinals and UT. Guarded with median and ulnar nerve glides. Patient will continue to benefit from skilled PT services to modify and progress therapeutic interventions, address functional mobility deficits, address ROM deficits, address strength deficits, analyze and address soft tissue restrictions, analyze and cue movement patterns, analyze and modify body mechanics/ergonomics, assess and modify postural abnormalities, address imbalance/dizziness and instruct in home and community integration to attain remaining goals. [x]  See Plan of Care  []  See progress note/recertification  []  See Discharge Summary         Progress towards goals / Updated goals:  Short Term Goals: To be accomplished in 1 weeks:  1.  Therapist to establish HEP for strength and ROM to improve ease with ADLs.             MET     Long Term Goals: To be accomplished in 4 weeks:  1. Patient will be independent with HEP to improve carryover of functional gains with ADLs between visits.                Eval Status:n/a              Reports compliance  2.  Pt will report decrease in average pain rating on VAS to <5/10 to improve ease with daily tasks.              Eval Status:8/10              Progressing, ranging from 4-7/10 over the last 2 visits  3. . Pt will increase FOTO score to 64 points to demostrate improved function.              Eval Status: FOTO: 44              To be assessed at PN  4.   . Pt will increase right Shoulder flexion/ abduction/strength to grossly 5/5 in order to improve functional lift & carry.              Eval Status:              Shoulder flexion: 3+/5              Shoulder Abduction: 4+/5              Too early to appreciate gains    PLAN  []  Upgrade activities as tolerated     [x]  Continue plan of care  []  Update interventions per flow sheet       []  Discharge due to:_  []  Other:_      Lisbeth Salgado PTA, CSCS 10/22/2019  5:49 PM    Future Appointments   Date Time Provider Ramy Olvera   10/24/2019  4:30 PM Norma Moon, PT [facility]   10/29/2019  3:30 PM Sammy Hodgkin, PT [facility]   10/31/2019  5:00 PM Norma Moon PT [facility]   11/14/2019 10:15 AM Kareem Chan  [address]

## 2019-10-29 ENCOUNTER — HOSPITAL ENCOUNTER (OUTPATIENT)
Dept: PHYSICAL THERAPY | Age: 39
Discharge: HOME | End: 2019-10-29
Payer: COMMERCIAL

## 2019-10-29 PROCEDURE — 97140 MANUAL THERAPY 1/> REGIONS: CPT

## 2019-10-29 PROCEDURE — 97112 NEUROMUSCULAR REEDUCATION: CPT

## 2019-10-29 PROCEDURE — 97014 ELECTRIC STIMULATION THERAPY: CPT

## 2019-10-29 NOTE — PROGRESS NOTES
PT DAILY TREATMENT NOTE 10-18    Patient Name: Ben Osorio  Date:10/29/2019  : 1980  [x]  Patient  Verified  Payor: Loura Kussmaul / Plan: Christian 97 / Product Type: NAT /    In time:335  Out time:433  Total Treatment Time (min): 58  Visit #: 5 of 8    Medicare/BCBS Only   Total Timed Codes (min):  48 1:1 Treatment Time:  40       Treatment Area: Pain in neck [M54.2]  Low back pain [M54.5]  Pain in right shoulder [M25.511]    SUBJECTIVE  Pain Level (0-10 scale): 7  Any medication changes, allergies to medications, adverse drug reactions, diagnosis change, or new procedure performed?: [x] No    [] Yes (see summary sheet for update)  Subjective functional status/changes:   [] No changes reported  \"i'm sore in my neck. The headaches have been bad. \"    OBJECTIVE    Modality rationale: decrease pain and increase tissue extensibility to improve the patients ability to improve mobility and positional tolerance   Min Type Additional Details    10 [x] Estim:  [x]Unatt       [x]IFC  []Premod                        []Other:  []w/ice   [x]w/heat  Position: supine with wedge   Location: neck      [] Estim: []Att    []TENS instruct  []NMES                    []Other:  []w/US   []w/ice   []w/heat  Position:  Location:      []  Traction: [] Cervical       []Lumbar                       [] Prone          []Supine                       []Intermittent   []Continuous Lbs:  [] before manual  [] after manual      []  Ultrasound: []Continuous   [] Pulsed                           []1MHz   []3MHz W/cm2:  Location:      []  Iontophoresis with dexamethasone         Location: [] Take home patch   [] In clinic            []  Laser with stim  []  Other:  Position:  Location:      []  Vasopneumatic Device Pressure:       [] lo [] med [] hi   Temperature: [] lo [] med [] hi    [x] Skin assessment post-treatment:  [x]intact []redness- no adverse reaction    []redness  adverse reaction:      10 min Therapeutic Exercise:  [x] See flow sheet :   Rationale: increase ROM and increase strength to improve the patients ability to perform ADLs      30 min Neuromuscular Re-education:  [x]  See flow sheet : postural re-ed activities   Rationale: increase ROM, increase strength, improve coordination, improve balance and increase proprioception  to improve the patients ability to improve mobility and upright posture     8 min Manual Therapy:  SOR, STM to c/s paraspinals and B UT, gentle cervical traction   Rationale: decrease pain, increase ROM, increase tissue extensibility, decrease trigger points and increase postural awareness to improve mobility and positional tolerance            With   [] TE   [] TA   [] neuro   [] other: Patient Education: [x] Review HEP    [] Progressed/Changed HEP based on:   [] positioning   [] body mechanics   [] transfers   [] heat/ice application    [] other:      Other Objective/Functional Measures:      Pain Level (0-10 scale) post treatment: 4    ASSESSMENT/Changes in Function: patient reports improved radicular symptoms. has not yet received results in regards to concussion testing. Will continue to monitor. Patient will continue to benefit from skilled PT services to modify and progress therapeutic interventions, address functional mobility deficits, address ROM deficits, address strength deficits, analyze and address soft tissue restrictions, analyze and cue movement patterns, analyze and modify body mechanics/ergonomics, assess and modify postural abnormalities, address imbalance/dizziness and instruct in home and community integration to attain remaining goals. []  See Plan of Care  []  See progress note/recertification  []  See Discharge Summary         Progress towards goals / Updated goals:  Short Term Goals: To be accomplished in 1 weeks:  1. Therapist to establish HEP for strength and ROM to improve ease with ADLs.             MET     Long Term Goals: To be accomplished in 4 weeks:  1.  Patient will be independent with HEP to improve carryover of functional gains with ADLs between visits.                Eval Status:n/a              Reports compliance  2.  Pt will report decrease in average pain rating on VAS to <5/10 to improve ease with daily tasks.              Eval Status:8/10              Progressing, ranging from 4-7/10 over the last 2 visits  3. . Pt will increase FOTO score to 64 points to demostrate improved function.              Eval Status: FOTO: 44              To be assessed at PN  4.   . Pt will increase right Shoulder flexion/ abduction/strength to grossly 5/5 in order to improve functional lift & carry.              Eval Status:              Shoulder flexion: 3+/5              Shoulder Abduction: 4+/5              Too early to appreciate gains       PLAN  []  Upgrade activities as tolerated     [x]  Continue plan of care  []  Update interventions per flow sheet       []  Discharge due to:_  []  Other:_      Ramon Closs, PT 10/29/2019  2:58 PM    Future Appointments   Date Time Provider Ramy Rodriguezi   10/29/2019  3:30 PM Mauri Mclean, PT [facility]   10/31/2019  5:00 PM [facility]   11/14/2019 10:15 AM Ju Delvalle  [address]

## 2019-10-31 ENCOUNTER — APPOINTMENT (OUTPATIENT)
Dept: PHYSICAL THERAPY | Age: 39
End: 2019-10-31
Payer: COMMERCIAL

## 2019-11-01 ENCOUNTER — HOSPITAL ENCOUNTER (OUTPATIENT)
Dept: PHYSICAL THERAPY | Age: 39
Discharge: HOME | End: 2019-11-01
Payer: COMMERCIAL

## 2019-11-01 PROCEDURE — 97014 ELECTRIC STIMULATION THERAPY: CPT

## 2019-11-01 PROCEDURE — 97110 THERAPEUTIC EXERCISES: CPT

## 2019-11-01 NOTE — PROGRESS NOTES
PT DAILY TREATMENT NOTE 10    Patient Name: Adiel Ellis  Date:2019  : 1980  [x]  Patient  Verified  Payor: Irina Stanford / Plan: Christian 97 / Product Type: NAT /    In time:337  Out time:420  Total Treatment Time (min): 43  Visit #: 6 of 8    Medicare/BCBS Only   Total Timed Codes (min):  33 1:1 Treatment Time:  17       Treatment Area: Pain in neck [M54.2]  Low back pain [M54.5]  Pain in right shoulder [M25.511]    SUBJECTIVE  Pain Level (0-10 scale): 5  Any medication changes, allergies to medications, adverse drug reactions, diagnosis change, or new procedure performed?: [x] No    [] Yes (see summary sheet for update)  Subjective functional status/changes:   [] No changes reported  \"I am doing better but I am still having headaches\"    OBJECTIVE    Modality rationale: decrease pain and increase tissue extensibility to improve the patients ability to drive   Min Type Additional Details   10 [x] Estim:  [x]Unatt       []IFC  []Premod                        []Other:  []w/ice   [x]w/heat  Position: seated  Location: cervical     [x] Estim: []Att    []TENS instruct  []NMES                    []Other:  []w/US   []w/ice   []w/heat  Position:  Location:    []  Traction: [] Cervical       []Lumbar                       [] Prone          []Supine                       []Intermittent   []Continuous Lbs:  [] before manual  [] after manual    []  Ultrasound: []Continuous   [] Pulsed                           []1MHz   []3MHz W/cm2:  Location:    []  Iontophoresis with dexamethasone         Location: [] Take home patch   [] In clinic    []  Ice     []  heat  []  Ice massage  []  Laser   []  Anodyne Position:  Location:    []  Laser with stim  []  Other:  Position:  Location:    []  Vasopneumatic Device Pressure:       [] lo [] med [] hi   Temperature: [] lo [] med [] hi   [] Skin assessment post-treatment:  [x]intact []redness- no adverse reaction    []redness - adverse reaction:     33 min Therapeutic Exercise:  [x] See flow sheet :   Rationale: increase ROM and increase strength to improve the patients ability to perform ADLs          With   [] TE   [] TA   [] neuro   [] other: Patient Education: [x] Review HEP    [] Progressed/Changed HEP based on:   [] positioning   [] body mechanics   [] transfers   [] heat/ice application    [] other:      Other Objective/Functional Measures:      Pain Level (0-10 scale) post treatment: 3    ASSESSMENT/Changes in Function: Pt frequency of headaches are reducing as a result of PT and home manual therapy, however, pt continues to experience headaches. Trigger points present in SCM, instructed on self soft tissue mobilization as referral patterns reproduce described headaches. Pt may benefit from dry needling to address persistent trigger points with somatic referral patterns. Patient will continue to benefit from skilled PT services to modify and progress therapeutic interventions, address functional mobility deficits, address ROM deficits, address strength deficits, analyze and address soft tissue restrictions, analyze and cue movement patterns, analyze and modify body mechanics/ergonomics and assess and modify postural abnormalities to attain remaining goals. []  See Plan of Care  []  See progress note/recertification  []  See Discharge Summary         Progress towards goals / Updated goals:  Short Term Goals: To be accomplished in 1 weeks:  1. Therapist to establish HEP for strength and ROM to improve ease with ADLs.             MET     Long Term Goals: To be accomplished in 4 weeks:  1. Patient will be independent with HEP to improve carryover of functional gains with ADLs between visits.             Eval Status:n/a              Reports compliance  2.  Pt will report decrease in average pain rating on VAS to <5/10 to improve ease with daily tasks.              Eval Status:8/10              Progressing, ranging from 4-7/10 over the last 2 visits  3. . Pt will increase FOTO score to 64 points to demostrate improved function.              Eval Status: FOTO: 44              To be assessed at PN  4.   . Pt will increase right Shoulder flexion/ abduction/strength to grossly 5/5 in order to improve functional lift & carry.              Eval Status:              Shoulder flexion: 3+/5              Shoulder Abduction: 4+/5              Too early to appreciate gains    PLAN  []  Upgrade activities as tolerated     []  Continue plan of care  []  Update interventions per flow sheet       []  Discharge due to:_  []  Other:_      Alberto Ennis, PT 11/1/2019  5:13 PM    Future Appointments   Date Time Provider Ramy Olvera   11/5/2019  4:30 PM Leland Johnson PT [facility]   11/8/2019  4:30 PM Leland Johnson PT [facility]   11/14/2019 10:15 AM Halima Lopes  [address]

## 2019-11-05 ENCOUNTER — HOSPITAL ENCOUNTER (OUTPATIENT)
Dept: PHYSICAL THERAPY | Age: 39
Discharge: HOME | End: 2019-11-05
Payer: COMMERCIAL

## 2019-11-05 PROCEDURE — 97140 MANUAL THERAPY 1/> REGIONS: CPT

## 2019-11-05 PROCEDURE — 97110 THERAPEUTIC EXERCISES: CPT

## 2019-11-05 NOTE — PROGRESS NOTES
PT DAILY TREATMENT NOTE 10-18    Patient Name: Deb Conklin  Date:2019  : 1980  [x]  Patient  Verified  Payor: Gordon Thompson / Plan: Christian 97 / Product Type: NAT /    In time:438  Out time:536  Total Treatment Time (min): 58  Visit #: 7 of 8    Medicare/BCBS Only   Total Timed Codes (min):  48 1:1 Treatment Time:  40       Treatment Area: Pain in neck [M54.2]  Low back pain [M54.5]  Pain in right shoulder [M25.511]    SUBJECTIVE  Pain Level (0-10 scale): 5  Any medication changes, allergies to medications, adverse drug reactions, diagnosis change, or new procedure performed?: [x] No    [] Yes (see summary sheet for update)  Subjective functional status/changes:   [] No changes reported  Pt has no significant changes to report.     OBJECTIVE    Modality rationale: decrease pain and increase tissue extensibility to improve the patients ability to tolerate driving    Min Type Additional Details    [] Estim:  []Unatt       []IFC  []Premod                        []Other:  []w/ice   []w/heat  Position:  Location:    [] Estim: []Att    []TENS instruct  []NMES                    []Other:  []w/US   []w/ice   []w/heat  Position:  Location:    []  Traction: [] Cervical       []Lumbar                       [] Prone          []Supine                       []Intermittent   []Continuous Lbs:  [] before manual  [] after manual    []  Ultrasound: []Continuous   [] Pulsed                           []1MHz   []3MHz W/cm2:  Location:    []  Iontophoresis with dexamethasone         Location: [] Take home patch   [] In clinic   10 []  Ice     [x]  heat  []  Ice massage  []  Laser   []  Anodyne Position: supine  Location: cervical and thoracic spine    []  Laser with stim  []  Other:  Position:  Location:    []  Vasopneumatic Device Pressure:       [] lo [] med [] hi   Temperature: [] lo [] med [] hi   [] Skin assessment post-treatment:  [x]intact []redness- no adverse reaction    []redness - adverse reaction: 32 min Therapeutic Exercise:  [x] See flow sheet :   Rationale: increase ROM and increase strength to improve the patients ability to perform ADLs    8 min Manual Therapy:  Trigger point release to left suscapularis, upper trapezius and SCM   Rationale: decrease pain, increase tissue extensibility and decrease trigger points to reduce frequency of headaches    With   [] TE   [] TA   [] neuro   [] other: Patient Education: [x] Review HEP    [] Progressed/Changed HEP based on:   [] positioning   [] body mechanics   [] transfers   [] heat/ice application    [] other:      Other Objective/Functional Measures: added TB rows and extension for scapular stabilization      Pain Level (0-10 scale) post treatment: 2    ASSESSMENT/Changes in Function: Pt trigger points continue to decrease, some of them resolving, with manual therapy, however, carry over between sessions is moderate. Pt did well with new scapular stabilization exercises, requiring verbal cues for proper activation and prevent upper trap substitutions. Patient will continue to benefit from skilled PT services to modify and progress therapeutic interventions, address functional mobility deficits, address ROM deficits, address strength deficits, analyze and address soft tissue restrictions, analyze and cue movement patterns, analyze and modify body mechanics/ergonomics and assess and modify postural abnormalities to attain remaining goals. []  See Plan of Care  []  See progress note/recertification  []  See Discharge Summary         Progress towards goals / Updated goals:  Short Term Goals: To be accomplished in 1 weeks:  1. Therapist to establish HEP for strength and ROM to improve ease with ADLs.             MET     Long Term Goals: To be accomplished in 4 weeks:  1. Patient will be independent with HEP to improve carryover of functional gains with ADLs between visits.                Eval Status:n/a              Reports compliance  2.  Pt will report decrease in average pain rating on VAS to <5/10 to improve ease with daily tasks.              Eval Status:8/10              Progressing, ranging from 4-7/10 over the last 2 visits  3. . Pt will increase FOTO score to 64 points to demostrate improved function.              Eval Status: FOTO: 44              To be assessed at PN  4.   . Pt will increase right Shoulder flexion/ abduction/strength to grossly 5/5 in order to improve functional lift & carry.              Eval Status:              Shoulder flexion: 3+/5              Shoulder Abduction: 4+/5              Too early to appreciate gains    PLAN  []  Upgrade activities as tolerated     []  Continue plan of care  []  Update interventions per flow sheet       []  Discharge due to:_  []  Other:_      Yuliana Biggs PT 11/5/2019  4:41 PM    Future Appointments   Date Time Provider Ramy Olvera   11/8/2019  4:30 PM Rebeca Londono, PT [facility]   11/14/2019 10:15 AM Beba Page  [address]

## 2019-11-13 ENCOUNTER — HOSPITAL ENCOUNTER (OUTPATIENT)
Dept: PHYSICAL THERAPY | Age: 39
Discharge: HOME | End: 2019-11-13
Payer: COMMERCIAL

## 2019-11-13 PROCEDURE — 97140 MANUAL THERAPY 1/> REGIONS: CPT

## 2019-11-13 PROCEDURE — 97110 THERAPEUTIC EXERCISES: CPT

## 2019-11-13 NOTE — PROGRESS NOTES
PT DAILY TREATMENT NOTE 10-18    Patient Name: Charlee Maynard  Date:2019  : 1980  [x]  Patient  Verified  Payor: Sasha Kaiser / Plan: Christian 97 / Product Type: NAT /    In time:503  Out time:549  Total Treatment Time (min): 46  Visit #: 1 of 8    Medicare/BCBS Only   Total Timed Codes (min):  36 1:1 Treatment Time:  36       Treatment Area: Pain in neck [M54.2]  Low back pain [M54.5]  Pain in right shoulder [M25.511]    SUBJECTIVE  Pain Level (0-10 scale): 5  Any medication changes, allergies to medications, adverse drug reactions, diagnosis change, or new procedure performed?: [x] No    [] Yes (see summary sheet for update)  Subjective functional status/changes:   [] No changes reported  See PN    OBJECTIVE    Modality rationale: decrease pain and increase tissue extensibility to improve the patients ability to tolerate sustained postures   Min Type Additional Details    [] Estim:  []Unatt       []IFC  []Premod                        []Other:  []w/ice   []w/heat  Position:  Location:    [] Estim: []Att    []TENS instruct  []NMES                    []Other:  []w/US   []w/ice   []w/heat  Position:  Location:    []  Traction: [] Cervical       []Lumbar                       [] Prone          []Supine                       []Intermittent   []Continuous Lbs:  [] before manual  [] after manual    []  Ultrasound: []Continuous   [] Pulsed                           []1MHz   []3MHz W/cm2:  Location:    []  Iontophoresis with dexamethasone         Location: [] Take home patch   [] In clinic   10 []  Ice     [x]  heat  []  Ice massage  []  Laser   []  Anodyne Position:seated  Location: cervical     []  Laser with stim  []  Other:  Position:  Location:    []  Vasopneumatic Device Pressure:       [] lo [] med [] hi   Temperature: [] lo [] med [] hi   [] Skin assessment post-treatment:  [x]intact []redness- no adverse reaction    []redness - adverse reaction:     26 min Therapeutic Exercise:  [x] See flow sheet :   Rationale: increase ROM and increase strength to improve the patients ability to perform ADLs    10 min Manual Therapy:  SOR and trigger point release to bilateral SCM   Rationale: decrease pain, increase tissue extensibility and decrease trigger points to improve quality of sleep    With   [] TE   [] TA   [] neuro   [] other: Patient Education: [x] Review HEP    [] Progressed/Changed HEP based on:   [] positioning   [] body mechanics   [] transfers   [] heat/ice application    [] other:      Other Objective/Functional Measures:   Rigth shoulder strength: flexion and abduction = 4+/5  FOTO = 57     Pain Level (0-10 scale) post treatment: 4    ASSESSMENT/Changes in Function: see PN    Patient will continue to benefit from skilled PT services to modify and progress therapeutic interventions, address functional mobility deficits, address ROM deficits, address strength deficits, analyze and address soft tissue restrictions, analyze and cue movement patterns, analyze and modify body mechanics/ergonomics and assess and modify postural abnormalities to attain remaining goals. []  See Plan of Care  []  See progress note/recertification  []  See Discharge Summary         Progress towards goals / Updated goals:  1. Patient will be independent with HEP to improve carryover of functional gains with ADLs between visits.                PN status: Reports compliance, will update near DC  2.  Pt will report decrease in average pain rating on VAS to <5/10 to improve ease with daily tasks.              PN status: Progressin-6/10 on average  3. . Pt will increase FOTO score to 64 points to demostrate improved function.              PN status: Progressin  4.   . Pt will increase right Shoulder flexion/ abduction/strength to grossly 5/5 in order to improve functional lift & carry.              PN status: Progressing                          Flexion = 4+/5                          Abduction = 4+/5    PLAN  [x] Upgrade activities as tolerated     [x]  Continue plan of care  []  Update interventions per flow sheet       []  Discharge due to:_  []  Other:_      Sher Shaw, KIZZY 11/13/2019  5:56 PM    Future Appointments   Date Time Provider Ramy May   11/14/2019 10:15 AM Ainsley Pitts  [address]   11/19/2019  2:30 PM Valeriano Mccray PTA [facility]   11/21/2019  5:00 PM Kitty Henson PT [facility]   11/25/2019  2:30 PM Valeriano Mccray PTA [facility]

## 2019-11-13 NOTE — PROGRESS NOTES
[facility]  [address] [place], [address] [phone] fax    Progress Note  Patient name: Graydon Schaumann Start of Care: 2019   Referral source: Marbin Hargrove MD : 1980                Medical Diagnosis: Pain in right shoulder [M25.511]  Bursitis of right shoulder [M75.51]  Payor: Misbah Velasquez / Plan: Ariana Chowdhuryr / Product Type: HMO /  Onset Date:19                Treatment Diagnosis: right shoulder pain   Prior Hospitalization: see medical history Provider#: [id number]   Medications: Verified on Patient summary List    Comorbidities: lupus   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 Therapist to establish HEP for strength and ROM to improve ease with ADLs.             MET     Long Term Goals: To be accomplished in 4 weeks:  1. Patient will be independent with HEP to improve carryover of functional gains with ADLs between visits.                Eval Status:n/a              Reports compliance, will update near DC  2.  Pt will report decrease in average pain rating on VAS to <5/10 to improve ease with daily tasks.              Eval Status:8/10              Progressin-6/10 on average  3. . Pt will increase FOTO score to 64 points to demostrate improved function.              Eval Status: FOTO: 44              Progressin  4.   . Pt will increase right Shoulder flexion/ abduction/strength to grossly 5/5 in order to improve functional lift & carry.              Eval Status:              Shoulder flexion: 3+/5              Shoulder Abduction: 4+/5              Progressing    Flexion = 4+/5    Abduction = 4+/5    Key Functional Changes:   Functional Gains: increased strength, improved lifting, reaching overhead  Functional Deficits: continued numbness tingling, gripping and carrying heavier objects   % improvement: 60%  Pain   Average: 5-6/10       Best: 10     Worst: 8/10  Patient Goal: \"to continue regaining full movement/coordination of the muscles and the hands\"    Updated Goals: to be achieved in 4 weeks:  1. Patient will be independent with HEP to improve carryover of functional gains with ADLs between visits.             PN status: Reports compliance, will update near DC  2.  Pt will report decrease in average pain rating on VAS to <5/10 to improve ease with daily tasks.              PN status: Progressin-6/10 on average  3. . Pt will increase FOTO score to 64 points to demostrate improved function.              PN status: Progressin  4.   . Pt will increase right Shoulder flexion/ abduction/strength to grossly 5/5 in order to improve functional lift & carry.              PN status: Progressing    Flexion = 4+/5    Abduction = 4+/5    ASSESSMENT/RECOMMENDATIONS: Ms. David Abraham has made significant progress in her strength and her pain is reducing as well. She continues to have numbness and tingling into the left UE that is managed with directional exercises. She is better able to perform ADLs, especially with those requiring reaching overhead but is unable to lift/carry heavier items.  Pt will benefit from continued skilled PT in order to address remaining deficits and to maximize functional potential.     [x]Continue therapy per initial plan/protocol at a frequency of  1-2 x per week for 4 weeks up to 8 visits  []Continue therapy with the following recommended changes:_____________________      _____________________________________________________________________  []Discontinue therapy progressing towards or have reached established goals  []Discontinue therapy due to lack of appreciable progress towards goals  []Discontinue therapy due to lack of attendance or compliance  []Await Physician's recommendations/decisions regarding therapy  []Other:________________________________________________________________    Thank you for this referral.    Stephen Diez, PT 11/13/2019 5:24 PM  NOTE TO PHYSICIAN:  PLEASE COMPLETE THE ORDERS BELOW AND   FAX TO [facility]: ([phone]  If you are unable to process this request in 24 hours please contact our office: [phone]    []  I have read the above report and request that my patient continue as recommended. []  I have read the above report and request that my patient continue therapy with the following changes/special instructions:________________________________________  [] I have read the above report and request that my patient be discharged from therapy.     [de-identified] Signature:____________Date:_________TIME:________    [facility], Date and Time must be completed for valid certification **

## 2019-11-14 ENCOUNTER — OFFICE VISIT (OUTPATIENT)
Dept: ORTHOPEDIC SURGERY | Age: 39
End: 2019-11-14

## 2019-11-14 VITALS
DIASTOLIC BLOOD PRESSURE: 89 MMHG | TEMPERATURE: 98 F | WEIGHT: 192.4 LBS | HEIGHT: 65 IN | OXYGEN SATURATION: 100 % | BODY MASS INDEX: 32.06 KG/M2 | RESPIRATION RATE: 18 BRPM | SYSTOLIC BLOOD PRESSURE: 150 MMHG | HEART RATE: 62 BPM

## 2019-11-14 DIAGNOSIS — R20.2 PARESTHESIAS IN LEFT HAND: ICD-10-CM

## 2019-11-14 DIAGNOSIS — S13.4XXA WHIPLASH INJURY TO NECK, INITIAL ENCOUNTER: Primary | ICD-10-CM

## 2019-11-14 RX ORDER — NAPROXEN 500 MG/1
500 TABLET ORAL
Qty: 45 TAB | Refills: 0 | Status: SHIPPED | OUTPATIENT
Start: 2019-11-14 | End: 2021-05-30

## 2019-11-14 NOTE — PATIENT INSTRUCTIONS
Back Care and Preventing Injuries: Care Instructions  Your Care Instructions    You can hurt your back doing many everyday activities: lifting a heavy box, bending down to garden, exercising at the gym, and even getting out of bed. But you can keep your back strong and healthy by doing some exercises. You also can follow a few tips for sitting, sleeping, and lifting to avoid hurting your back again. Talk to your doctor before you start an exercise program. Ask for help if you want to learn more about keeping your back healthy. Follow-up care is a key part of your treatment and safety. Be sure to make and go to all appointments, and call your doctor if you are having problems. It's also a good idea to know your test results and keep a list of the medicines you take. How can you care for yourself at home? · Stay at a healthy weight to avoid strain on your lower back. · Do not smoke. Smoking increases the risk of osteoporosis, which weakens the spine. If you need help quitting, talk to your doctor about stop-smoking programs and medicines. These can increase your chances of quitting for good. · Make sure you sleep in a position that maintains your back's normal curves and on a mattress that feels comfortable. Sleep on your side with a pillow between your knees, or sleep on your back with a pillow under your knees. These positions can reduce strain on your back. · When you get out of bed, lie on your side and bend both knees. Drop your feet over the edge of the bed as you push up with both arms. Scoot to the edge of the bed. Make sure your feet are in line with your rear end (buttocks), and then stand up. · If you must stand for a long time, put one foot on a stool, ledge, or box. Exercise to strengthen your back and other muscles  · Get at least 30 minutes of exercise on most days of the week. Walking is a good choice.  You also may want to do other activities, such as running, swimming, cycling, or playing tennis or team sports. · Stretch your back muscles. Here are few exercises to try:  ? Lie on your back with your knees bent and your feet flat on the floor. Gently pull one bent knee to your chest. Put that foot back on the floor, and then pull the other knee to your chest. Hold for 15 to 30 seconds. Repeat 2 to 4 times. ? Do pelvic tilts. Lie on your back with your knees bent. Tighten your stomach muscles. Pull your belly button (navel) in and up toward your ribs. You should feel like your back is pressing to the floor and your hips and pelvis are slightly lifting off the floor. Hold for 6 seconds while breathing smoothly. · Keep your core muscles strong. The muscles of your back, belly (abdomen), and buttocks support your spine. ? Pull in your belly, and imagine pulling your navel toward your spine. Hold this for 6 seconds, then relax. Remember to keep breathing normally as you tense your muscles. ? Do curl-ups. Always do them with your knees bent. Keep your low back on the floor, and curl your shoulders toward your knees using a smooth, slow motion. Keep your arms folded across your chest. If this bothers your neck, try putting your hands behind your neck (not your head), with your elbows spread apart. ? Lie on your back with your knees bent and your feet flat on the floor. Tighten your belly muscles, and then push with your feet and raise your buttocks up a few inches. Hold this position 6 seconds as you continue to breathe normally, then lower yourself slowly to the floor. Repeat 8 to 12 times. ? If you like group exercise, try Pilates or yoga. These classes have poses that strengthen the core muscles. Protect your back when you sit  · Place a small pillow, a rolled-up towel, or a lumbar roll in the curve of your back if you need extra support. · Sit in a chair that is low enough to let you place both feet flat on the floor with both knees nearly level with your hips.  If your chair or desk is too high, use a foot rest to raise your knees. · When driving, keep your knees nearly level with your hips. Sit straight, and drive with both hands on the steering wheel. Your arms should be in a slightly bent position. · Try a kneeling chair, which helps tilt your hips forward. This takes pressure off your lower back. · Try sitting on an exercise ball. It can rock from side to side, which helps keep your back loose. Lift properly  · Squat down, bending at the hips and knees only. If you need to, put one knee to the floor and extend your other knee in front of you, bent at a right angle (half kneeling). · Press your chest straight forward. This helps keep your upper back straight while keeping a slight arch in your low back. · Hold the load as close to your body as possible, at the level of your navel. · Use your feet to change direction, taking small steps. · Lead with your hips as you change direction. Keep your shoulders in line with your hips as you move. Do not twist your body. · Set down your load carefully, squatting with your knees and hips only. When should you call for help? Watch closely for changes in your health, and be sure to contact your doctor if you have any problems. Where can you learn more? Go to http://leila-giles.info/. Enter S810 in the search box to learn more about \"Back Care and Preventing Injuries: Care Instructions. \"  Current as of: June 26, 2019  Content Version: 12.2  © 5246-5320 [employer]. Care instructions adapted under license by [employer] (which disclaims liability or warranty for this information). If you have questions about a medical condition or this instruction, always ask your healthcare professional. [place] [address] any warranty or liability for your use of this information.

## 2019-11-14 NOTE — PROGRESS NOTES
Graydon Schaumann presents today for   Chief Complaint   Patient presents with    Neck Pain     CT fu    Shoulder Pain       Is someone accompanying this pt? NO    Is the patient using any DME equipment during OV? NO    Depression Screening:  3 most recent PHQ Screens 11/14/2019   Little interest or pleasure in doing things Not at all   Feeling down, depressed, irritable, or hopeless Not at all   Total Score PHQ 2 0       Learning Assessment:  Learning Assessment 11/14/2019   PRIMARY LEARNER Patient   PRIMARY LANGUAGE ENGLISH   LEARNER PREFERENCE PRIMARY DEMONSTRATION   ANSWERED BY patient   RELATIONSHIP SELF       Coordination of Care:  1. Have you been to the ER, urgent care clinic since your last visit? NO  Hospitalized since your last visit? NO    2. Have you seen or consulted any other health care providers outside of the [address] [place] [address] since your last visit? NO Include any pap smears or colon screening.  NO    Last  Checked 11/14/19
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
## 2019-11-19 ENCOUNTER — APPOINTMENT (OUTPATIENT)
Dept: PHYSICAL THERAPY | Age: 39
End: 2019-11-19
Payer: COMMERCIAL

## 2019-11-20 ENCOUNTER — HOSPITAL ENCOUNTER (OUTPATIENT)
Dept: PHYSICAL THERAPY | Age: 39
Discharge: HOME | End: 2019-11-20
Payer: COMMERCIAL

## 2019-11-20 PROCEDURE — 97110 THERAPEUTIC EXERCISES: CPT

## 2019-11-20 PROCEDURE — 97140 MANUAL THERAPY 1/> REGIONS: CPT

## 2019-11-20 NOTE — PROGRESS NOTES
PT DAILY TREATMENT NOTE 10-18    Patient Name: Sara Meraz  Date:2019  : 1980  [x]  Patient  Verified  Payor: Austin Wells / Plan: Christian 97 / Product Type: NAT /    In time:303  Out time:354  Total Treatment Time (min): 51  Visit #: 2 of 8    Medicare/BCBS Only   Total Timed Codes (min):  41 1:1 Treatment Time:  32       Treatment Area: Pain in neck [M54.2]  Low back pain [M54.5]  Pain in right shoulder [M25.511]    SUBJECTIVE  Pain Level (0-10 scale): 6  Any medication changes, allergies to medications, adverse drug reactions, diagnosis change, or new procedure performed?: [x] No    [] Yes (see summary sheet for update)  Subjective functional status/changes:   [] No changes reported  \"I think I'm getting better. \"    OBJECTIVE    Modality rationale: decrease pain to improve the patients ability to improve mobility and positional tolerance   Min Type Additional Details    [] Estim:  []Unatt       []IFC  []Premod                        []Other:  []w/ice   []w/heat  Position:  Location:    [] Estim: []Att    []TENS instruct  []NMES                    []Other:  []w/US   []w/ice   []w/heat  Position:  Location:    []  Traction: [] Cervical       []Lumbar                       [] Prone          []Supine                       []Intermittent   []Continuous Lbs:  [] before manual  [] after manual    []  Ultrasound: []Continuous   [] Pulsed                           []1MHz   []3MHz W/cm2:  Location:    []  Iontophoresis with dexamethasone         Location: [] Take home patch   [] In clinic   10 []  Ice     [x]  heat  []  Ice massage  []  Laser   []  Anodyne Position: seated   Location: neck    []  Laser with stim  []  Other:  Position:  Location:    []  Vasopneumatic Device Pressure:       [] lo [] med [] hi   Temperature: [] lo [] med [] hi   [x] Skin assessment post-treatment:  [x]intact []redness- no adverse reaction    []redness - adverse reaction:     33 min Therapeutic Exercise:  [x] See flow sheet :   Rationale: increase ROM and increase strength to improve the patients ability to perform ADLs    8 min Manual Therapy:  SOR, STM to c/s paraspinals and B UT. Rationale: decrease pain, increase ROM, increase tissue extensibility, decrease trigger points and increase postural awareness to improve mobility and positional tolerance        With   [x] TE   [] TA   [] neuro   [] other: Patient Education: [x] Review HEP    [] Progressed/Changed HEP based on:   [x] positioning   [x] body mechanics   [] transfers   [] heat/ice application    [] other:      Other Objective/Functional Measures:      Pain Level (0-10 scale) post treatment: 4    ASSESSMENT/Changes in Function: Pt reports making progress, but her pain remains 5-7/10. She demonstrates improved functional mobility and postural awareness. Patient will continue to benefit from skilled PT services to modify and progress therapeutic interventions, address functional mobility deficits, address ROM deficits, address strength deficits, analyze and address soft tissue restrictions, analyze and cue movement patterns, analyze and modify body mechanics/ergonomics, assess and modify postural abnormalities, address imbalance/dizziness and instruct in home and community integration to attain remaining goals. [x]  See Plan of Care  []  See progress note/recertification  []  See Discharge Summary         Progress towards goals / Updated goals:  1. Patient will be independent with HEP to improve carryover of functional gains with ADLs between visits.                CB status: Reports compliance, will update near DC  2.  Pt will report decrease in average pain rating on VAS to <5/10 to improve ease with daily tasks.              PN status: Progressin-6/10 on average  3. . Pt will increase FOTO score to 64 points to demostrate improved function.              PN status: Progressin  4.   . Pt will increase right Shoulder flexion/ abduction/strength to grossly 5/5 in order to improve functional lift & carry.              PN status: Progressing                          Flexion = 4+/5                          Abduction = 4+/5    PLAN  []  Upgrade activities as tolerated     [x]  Continue plan of care  []  Update interventions per flow sheet       []  Discharge due to:_  []  Other:_      Tamar Rasheed PTA, CSCS 11/20/2019  4:21 PM    Future Appointments   Date Time Provider [facility]   11/21/2019  2:00 PM Ana Beaver  [facility]   11/21/2019  5:00 PM [facility]   11/25/2019  2:30 PM Jose Piper [facility]   1/9/2020 10:15 AM Lazaro Manzano  [address]

## 2019-11-21 ENCOUNTER — OFFICE VISIT (OUTPATIENT)
Dept: NEUROLOGY | Age: 39
End: 2019-11-21

## 2019-11-21 ENCOUNTER — HOSPITAL ENCOUNTER (OUTPATIENT)
Dept: PHYSICAL THERAPY | Age: 39
Discharge: HOME | End: 2019-11-21
Payer: COMMERCIAL

## 2019-11-21 VITALS
HEART RATE: 69 BPM | SYSTOLIC BLOOD PRESSURE: 118 MMHG | HEIGHT: 65 IN | DIASTOLIC BLOOD PRESSURE: 84 MMHG | WEIGHT: 199 LBS | TEMPERATURE: 98.4 F | RESPIRATION RATE: 18 BRPM | OXYGEN SATURATION: 99 % | BODY MASS INDEX: 33.15 KG/M2

## 2019-11-21 DIAGNOSIS — G44.319 ACUTE POST-TRAUMATIC HEADACHE, NOT INTRACTABLE: Primary | ICD-10-CM

## 2019-11-21 PROCEDURE — 97110 THERAPEUTIC EXERCISES: CPT

## 2019-11-21 PROCEDURE — 97530 THERAPEUTIC ACTIVITIES: CPT

## 2019-11-21 RX ORDER — AMITRIPTYLINE HYDROCHLORIDE 10 MG/1
10 TABLET, FILM COATED ORAL
Qty: 30 TAB | Refills: 3 | Status: SHIPPED | OUTPATIENT
Start: 2019-11-21 | End: 2019-12-21
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
## 2019-11-21 NOTE — LETTER
11/21/19 Patient: Shreya Bagley YOB: 1980 Date of Visit: 11/21/2019 Rayna Schrader MD 
[address] [facility] [address] [place] [address] VIA Facsimile: [phone] Juliana Sage NP 
[address] [facility] [address] VIA In Basket Dear MD Juliana Barney NP, Thank you for referring Ms. Carito Rodríguez to [address] [place] [address] for evaluation. My notes for this consultation are attached. If you have questions, please do not hesitate to call me. I look forward to following your patient along with you. Sincerely, Benita Mathis MD

## 2019-11-22 NOTE — PROGRESS NOTES
PT DAILY TREATMENT NOTE 10-18    Patient Name: Charlee Maynard  Date:2019  : 1980  [x]  Patient  Verified  Payor: Sasha Kaiser / Plan: Christian 97 / Product Type: NAT /    In time:502  Out time:554  Total Treatment Time (min): 52  Visit #: 3 of 8    Medicare/BCBS Only   Total Timed Codes (min):  42 1:1 Treatment Time:  42       Treatment Area: Pain in neck [M54.2]  Low back pain [M54.5]  Pain in right shoulder [M25.511]    SUBJECTIVE  Pain Level (0-10 scale): 6  Any medication changes, allergies to medications, adverse drug reactions, diagnosis change, or new procedure performed?: [] No    [x] Yes (see summary sheet for update)  Subjective functional status/changes:   [] No changes reported  Pt recently was prescribed elavil and is concerned about it being for depression vs headaches as her MD specified.      OBJECTIVE    Modality rationale: decrease pain and increase tissue extensibility to improve the patients ability to sleep    Min Type Additional Details    [] Estim:  []Unatt       []IFC  []Premod                        []Other:  []w/ice   []w/heat  Position:  Location:    [] Estim: []Att    []TENS instruct  []NMES                    []Other:  []w/US   []w/ice   []w/heat  Position:  Location:    []  Traction: [] Cervical       []Lumbar                       [] Prone          []Supine                       []Intermittent   []Continuous Lbs:  [] before manual  [] after manual    []  Ultrasound: []Continuous   [] Pulsed                           []1MHz   []3MHz W/cm2:  Location:    []  Iontophoresis with dexamethasone         Location: [] Take home patch   [] In clinic   10 []  Ice     [x]  heat  []  Ice massage  []  Laser   []  Anodyne Position: supine with tennis ball SOR  Location: cervical    []  Laser with stim  []  Other:  Position:  Location:    []  Vasopneumatic Device Pressure:       [] lo [] med [] hi   Temperature: [] lo [] med [] hi   [] Skin assessment post-treatment:  [x]intact []redness- no adverse reaction    []redness - adverse reaction:     25 min Therapeutic Exercise:  [x] See flow sheet :   Rationale: increase ROM and increase strength to improve the patients ability to perform ADLs with less pain    17 min Therapeutic Activity:  [x]  See flow sheet :   Rationale: increase ROM, increase strength and increase proprioception  to improve the patients ability to reach overhead with less pain            With   [] TE   [] TA   [] neuro   [] other: Patient Education: [x] Review HEP    [] Progressed/Changed HEP based on:   [] positioning   [] body mechanics   [] transfers   [] heat/ice application    [] other:      Other Objective/Functional Measures: progressed program as seen on flow sheet     Pain Level (0-10 scale) post treatment: 2    ASSESSMENT/Changes in Function: Pt responded very well to progression of exercises and use of sustained instrument assisted SOR. Pt felt encouraged with more challenging exercises, that she wants to get back to the gym and could see herself doing these exercises there. Pt requiring increased instruction for new exercises to prevent upper trap substitutions. Patient will continue to benefit from skilled PT services to modify and progress therapeutic interventions, address functional mobility deficits, address ROM deficits, address strength deficits, analyze and address soft tissue restrictions, analyze and cue movement patterns, analyze and modify body mechanics/ergonomics and assess and modify postural abnormalities to attain remaining goals. []  See Plan of Care  []  See progress note/recertification  []  See Discharge Summary         Progress towards goals / Updated goals:  1. Patient will be independent with HEP to improve carryover of functional gains with ADLs between visits.                TO status: Reports compliance, will update near DC   Reports compliance  2.  Pt will report decrease in average pain rating on VAS to <5/10 to improve ease with daily tasks.              PN status: Progressin-6/10 on average   No significant changes  3. . Pt will increase FOTO score to 64 points to demostrate improved function.              PN status: Progressin   To be assessed at 30day don  4.   . Pt will increase right Shoulder flexion/ abduction/strength to grossly 5/5 in order to improve functional lift & carry.              PN status: Progressing                          Flexion = 4+/5                          Abduction = 4+/5   Progressing, tolerating more challenging exercises     PLAN  [x]  Upgrade activities as tolerated     [x]  Continue plan of care  []  Update interventions per flow sheet       []  Discharge due to:_  []  Other:_      Stacey Landeros, PT 2019  7:52 AM    Future Appointments   Date Time Provider Ramy Olvera   2019  2:30 PM Gissell Mireles [place] [facility]   2020 10:15 AM Juliana Huber  [address]   2020  9:00 AM Luis Spence  [address] [facility]

## 2019-11-25 ENCOUNTER — HOSPITAL ENCOUNTER (OUTPATIENT)
Dept: PHYSICAL THERAPY | Age: 39
Discharge: HOME | End: 2019-11-25
Payer: COMMERCIAL

## 2019-11-25 PROCEDURE — 97112 NEUROMUSCULAR REEDUCATION: CPT

## 2019-11-25 PROCEDURE — 97110 THERAPEUTIC EXERCISES: CPT

## 2019-11-25 NOTE — PROGRESS NOTES
PT DAILY TREATMENT NOTE 10-18    Patient Name: James Martin  Date:2019  : 1980  [x]  Patient  Verified  Payor: Morena Stanley / Plan: Awilda Church / Product Type: NAT /    In time:227  Out time:319  Total Treatment Time (min): 52  Visit #: 4 of 8    Medicare/BCBS Only   Total Timed Codes (min):  42 1:1 Treatment Time:  42       Treatment Area: Pain in neck [M54.2]  Low back pain [M54.5]  Pain in right shoulder [M25.511]    SUBJECTIVE  Pain Level (0-10 scale): 5  Any medication changes, allergies to medications, adverse drug reactions, diagnosis change, or new procedure performed?: [x] No    [] Yes (see summary sheet for update)  Subjective functional status/changes:   [] No changes reported  \"I have some pain. \"    OBJECTIVE    Modality rationale: decrease pain to improve the patients ability to improve mobility and positional tolerance   Min Type Additional Details    [] Estim:  []Unatt       []IFC  []Premod                        []Other:  []w/ice   []w/heat  Position:  Location:    [] Estim: []Att    []TENS instruct  []NMES                    []Other:  []w/US   []w/ice   []w/heat  Position:  Location:    []  Traction: [] Cervical       []Lumbar                       [] Prone          []Supine                       []Intermittent   []Continuous Lbs:  [] before manual  [] after manual    []  Ultrasound: []Continuous   [] Pulsed                           []1MHz   []3MHz W/cm2:  Location:    []  Iontophoresis with dexamethasone         Location: [] Take home patch   [] In clinic   10 []  Ice     [x]  heat  []  Ice massage  []  Laser   []  Anodyne Position: supine with wedge  Location: neck    []  Laser with stim  []  Other:  Position:  Location:    []  Vasopneumatic Device Pressure:       [] lo [] med [] hi   Temperature: [] lo [] med [] hi   [x] Skin assessment post-treatment:  [x]intact []redness- no adverse reaction    []redness - adverse reaction:     12 min Therapeutic Exercise:  [x] See flow sheet :   Rationale: increase ROM and increase strength to improve the patients ability to perform ADLs    30 min Neuromuscular Re-education:  [x]  See flow sheet : core stabilization activities and postural re-ed    Rationale: increase ROM, increase strength, improve coordination, improve balance and increase proprioception  to improve the patients ability to improve mobility and core stability        With   [x] TE   [] TA   [x] neuro   [] other: Patient Education: [x] Review HEP    [] Progressed/Changed HEP based on:   [x] positioning   [x] body mechanics   [] transfers   [] heat/ice application    [] other:      Other Objective/Functional Measures:      Pain Level (0-10 scale) post treatment: 2    ASSESSMENT/Changes in Function: Pt is doing much better functionally, but continues to have pain 5-7/10 consistently. She demonstrates improved upright posture and core stability. Patient will continue to benefit from skilled PT services to modify and progress therapeutic interventions, address functional mobility deficits, address ROM deficits, address strength deficits, analyze and address soft tissue restrictions, analyze and cue movement patterns, analyze and modify body mechanics/ergonomics, assess and modify postural abnormalities, address imbalance/dizziness and instruct in home and community integration to attain remaining goals. [x]  See Plan of Care  []  See progress note/recertification  []  See Discharge Summary         Progress towards goals / Updated goals:  1. Patient will be independent with HEP to improve carryover of functional gains with ADLs between visits.                WJ status: Reports compliance, will update near DC              Reports compliance  2.  Pt will report decrease in average pain rating on VAS to <5/10 to improve ease with daily tasks.              PN status: Progressin-6/10 on average              No significant changes  3. . Pt will increase FOTO score to 64 points to demostrate improved function.              PN status: Progressin              To be assessed at [address]  4.   . Pt will increase right Shoulder flexion/ abduction/strength to grossly 5/5 in order to improve functional lift & carry.              PN status: Progressing                          Flexion = 4+/5                          Abduction = 4+/5              Progressing, tolerating more challenging exercises     PLAN  []  Upgrade activities as tolerated     [x]  Continue plan of care  []  Update interventions per flow sheet       []  Discharge due to:_  []  Other:_      Vanessa Hansen PTA, CSCS 2019  3:20 PM    Future Appointments   Date Time Provider [facility] [place]   2020 10:15 AM Magaly Grissom  [address]   2020  9:00 AM Juvencio Tomas  [address] [facility]

## 2019-12-02 ENCOUNTER — HOSPITAL ENCOUNTER (OUTPATIENT)
Dept: PHYSICAL THERAPY | Age: 39
Discharge: HOME | End: 2019-12-02
Payer: COMMERCIAL

## 2019-12-02 PROCEDURE — 97110 THERAPEUTIC EXERCISES: CPT

## 2019-12-02 PROCEDURE — 97112 NEUROMUSCULAR REEDUCATION: CPT

## 2019-12-02 NOTE — PROGRESS NOTES
PT DAILY TREATMENT NOTE 10-18    Patient Name: Shreya Bagley  Date:2019  : 1980  [x]  Patient  Verified  Payor: Aliza Lott / Plan: Christian 97 / Product Type: NAT /    In time:341  Out time:431  Total Treatment Time (min): 50  Visit #: 5 of 8    Medicare/BCBS Only   Total Timed Codes (min):  50 1:1 Treatment Time:  50       Treatment Area: Pain in neck [M54.2]  Low back pain [M54.5]  Pain in right shoulder [M25.511]    SUBJECTIVE  Pain Level (0-10 scale): 3  Any medication changes, allergies to medications, adverse drug reactions, diagnosis change, or new procedure performed?: [x] No    [] Yes (see summary sheet for update)  Subjective functional status/changes:   [] No changes reported  Pt reports her pain has been mostly in the upper back/shoulders and her lower back has been feeling better. Pt reports increased numbness/tingling when waking up in the morning. OBJECTIVE    20 min Therapeutic Exercise:  [x] See flow sheet :   Rationale: increase ROM and increase strength to improve the patients ability to perform ADLs with less pain    30 min Neuromuscular Re-education:  [x]  See flow sheet : scapular and cervical stabilization   Rationale: increase strength, improve coordination and increase proprioception  to improve the patients ability to perform work duties with less pain         With   [] TE   [] TA   [] neuro   [] other: Patient Education: [x] Review HEP    [] Progressed/Changed HEP based on:   [] positioning   [] body mechanics   [] transfers   [] heat/ice application    [] other:      Other Objective/Functional Measures: progressed exercises per flow sheet     Pain Level (0-10 scale) post treatment: 1    ASSESSMENT/Changes in Function: Pt tolerated progression of exercises without increased pain. Pt requiring tactile cues to activate serratus anterior and prevent upper trap substitutions.     Patient will continue to benefit from skilled PT services to modify and progress therapeutic interventions, address functional mobility deficits, address ROM deficits, address strength deficits, analyze and address soft tissue restrictions, analyze and cue movement patterns, analyze and modify body mechanics/ergonomics and assess and modify postural abnormalities to attain remaining goals. []  See Plan of Care  []  See progress note/recertification  []  See Discharge Summary         Progress towards goals / Updated goals:  1. Patient will be independent with HEP to improve carryover of functional gains with ADLs between visits.                HA status: Reports compliance, will update near DC              Reports compliance  2.  Pt will report decrease in average pain rating on VAS to <5/10 to improve ease with daily tasks.              PN status: Progressin-6/10 on average              No significant changes  3. . Pt will increase FOTO score to 64 points to demostrate improved function.              PN status: Progressin              To be assessed at 30day don  4.   . Pt will increase right Shoulder flexion/ abduction/strength to grossly 5/5 in order to improve functional lift & carry.              PN status: Progressing                          Flexion = 4+/5                          Abduction = 4+/5              Progressing, tolerating more challenging exercises     PLAN  [x]  Upgrade activities as tolerated     [x]  Continue plan of care  []  Update interventions per flow sheet       []  Discharge due to:_  []  Other:_      Jordan Ochoa, PT 2019  3:49 PM    Future Appointments   Date Time Provider Ramy Olvera   2019  3:00 PM Mary Thompson [facility]   2019  4:00 PM Danika Escalante PTA [facility]   2019  2:30 PM [facility]   2020 10:15 AM Chris Esquivel  [address]   2020  9:00 AM Beckie Issa  [address]

## 2019-12-04 ENCOUNTER — APPOINTMENT (OUTPATIENT)
Dept: PHYSICAL THERAPY | Age: 39
End: 2019-12-04
Payer: COMMERCIAL

## 2019-12-06 ENCOUNTER — HOSPITAL ENCOUNTER (OUTPATIENT)
Dept: PHYSICAL THERAPY | Age: 39
End: 2019-12-06
Payer: COMMERCIAL

## 2019-12-09 ENCOUNTER — HOSPITAL ENCOUNTER (OUTPATIENT)
Dept: PHYSICAL THERAPY | Age: 39
Discharge: HOME | End: 2019-12-09
Payer: COMMERCIAL

## 2019-12-09 PROCEDURE — 97112 NEUROMUSCULAR REEDUCATION: CPT

## 2019-12-09 PROCEDURE — 97110 THERAPEUTIC EXERCISES: CPT

## 2019-12-09 NOTE — PROGRESS NOTES
Physical Therapy Discharge Instructions      [facility] [address]  [address], [address] [phone] fax      Patient:  Rosie Wall  : 1980      Continue Home Exercise Program 1-2 times per day for 4 weeks, then decrease to 3 times per week      Continue with    [x] Ice  as needed     [x] Heat           Follow up with MD:     [] Upon completion of therapy     [x] As needed      Recommendations:     [x]   Return to activity with home program    []   Return to activity with the following modifications:       []Post Rehab Program    []Join Independent aquatic program     [x]Return to/join local gym    Anthony Ramsey PTA, CSCS   2019 4:13 PM

## 2019-12-09 NOTE — PROGRESS NOTES
PT DAILY TREATMENT NOTE 10-18    Patient Name: Suzan Alpers  Date:2019  : 1980  [x]  Patient  Verified  Payor: Yun Ma / Plan: Duy Barrera / Product Type: ANT /    In time:402  Out time:432  Total Treatment Time (min): 30  Visit #: 5 of 8    Medicare/BCBS Only   Total Timed Codes (min):  30 1:1 Treatment Time:  30       Treatment Area: Pain in neck [M54.2]  Low back pain [M54.5]  Pain in right shoulder [M25.511]    SUBJECTIVE  Pain Level (0-10 scale): 2  Any medication changes, allergies to medications, adverse drug reactions, diagnosis change, or new procedure performed?: [x] No    [] Yes (see summary sheet for update)  Subjective functional status/changes:   [] No changes reported  \"I'm doing pretty good. I went back to the gym. \"    OBJECTIVE    Modality rationale: patient declined   Min Type Additional Details    [] Estim:  []Unatt       []IFC  []Premod                        []Other:  []w/ice   []w/heat  Position:  Location:    [] Estim: []Att    []TENS instruct  []NMES                    []Other:  []w/US   []w/ice   []w/heat  Position:  Location:    []  Traction: [] Cervical       []Lumbar                       [] Prone          []Supine                       []Intermittent   []Continuous Lbs:  [] before manual  [] after manual    []  Ultrasound: []Continuous   [] Pulsed                           []1MHz   []3MHz W/cm2:  Location:    []  Iontophoresis with dexamethasone         Location: [] Take home patch   [] In clinic    []  Ice     []  heat  []  Ice massage  []  Laser   []  Anodyne Position:  Location:    []  Laser with stim  []  Other:  Position:  Location:    []  Vasopneumatic Device Pressure:       [] lo [] med [] hi   Temperature: [] lo [] med [] hi   [] Skin assessment post-treatment:  []intact []redness- no adverse reaction    []redness - adverse reaction:     15 min Therapeutic Exercise:  [x] See flow sheet :   Rationale: increase ROM and increase strength to improve the patients ability to perform ADLs    15 min Neuromuscular Re-education:  [x]  See flow sheet : scapular and cervical stabilization   Rationale: increase ROM, increase strength, improve coordination, improve balance and increase proprioception  to improve the patients ability to improve mobility and postural re-ed         With   [x] TE   [] TA   [x] neuro   [] other: Patient Education: [x] Review HEP    [] Progressed/Changed HEP based on:   [x] positioning   [x] body mechanics   [] transfers   [] heat/ice application    [] other:      Other Objective/Functional Measures:   FOTO 94    MMT right shoulder flexion 5/5 deg  MMT right shoulder abduction 5/5 deg     Pain Level (0-10 scale) post treatment: 1    ASSESSMENT/Changes in Function: Ms. Radha Guzmán has been a pleasure to treat and reports 75% improvement since beginning therapy. Pt reports ease with lifting overhead, strength, core stability, and has recently been able to return to the gym. She reports no functional deficits at this time. We are discharging to an updated HEP at this time. []  See Plan of Care  []  See progress note/recertification  [x]  See Discharge Summary         Progress towards goals / Updated goals:  1. Patient will be independent with HEP to improve carryover of functional gains with ADLs between visits.                NU status: Reports compliance, will update near DC              MET; Reports compliance  2.  Pt will report decrease in average pain rating on VAS to <5/10 to improve ease with daily tasks.              PN status: Progressin-6/10 on average              MET; 2/10 on average  3. . Pt will increase FOTO score to 64 points to demostrate improved function.              PN status: Progressin              MET; 94  4.   . Pt will increase right Shoulder flexion/ abduction/strength to grossly 5/5 in order to improve functional lift & carry.              PN status: Progressing                          Flexion = 4+/5                          Abduction = 4+/5              MET; 5/5 grossly     Functional Gains: lifting overhead, strength, core stability, return to the gym  Functional Deficits: none to report  % improvement: 75%  Pain   Average: 2/10       Best: 2/10     Worst: 5/10  Patient Goal: \"to regain strength\"    PLAN  []  Upgrade activities as tolerated     []  Continue plan of care  []  Update interventions per flow sheet       [x]  Discharge due to: [address]  []  Other:_      Marisol Quinn, PTA, CSCS 12/9/2019  4:35 PM    Future Appointments   Date Time Provider Ramy Olvera   12/13/2019  5:00 PM Dea [facility]   1/9/2020 10:15 AM Sallie Go  [address]   2/20/2020  9:00 AM Isabel Delcid  [facility]

## 2019-12-11 ENCOUNTER — APPOINTMENT (OUTPATIENT)
Dept: PHYSICAL THERAPY | Age: 39
End: 2019-12-11
Payer: COMMERCIAL

## 2019-12-11 NOTE — PROGRESS NOTES
[facility]  [address] [place], [address] [phone] fax    Discharge Summary  Patient name: Brenda Chun Start of Care: 2019   Referral source: Quique Rahman MD : 1980                Medical Diagnosis: Pain in right shoulder [M25.511]  Bursitis of right shoulder [M75.51]  Payor: Abram Curry / Plan: Maritza Thomason / Product Type: HMO /  Onset Date:19                Treatment Diagnosis: right shoulder pain   Prior Hospitalization: see medical history Provider#: [id number]   Medications: Verified on Patient summary List    Comorbidities: lupus   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 Patient will be independent with HEP to improve carryover of functional gains with ADLs between visits.                WQ status: Reports compliance, will update near DC              MET; Reports compliance  2.  Pt will report decrease in average pain rating on VAS to <5/10 to improve ease with daily tasks.              PN status: Progressin-6/10 on average              MET; 2/10 on average  3. . Pt will increase FOTO score to 64 points to demostrate improved function.              PN status: Progressin              MET; 94  4.   . Pt will increase right Shoulder flexion/ abduction/strength to grossly 5/5 in order to improve functional lift & carry.              PN status: Progressing                          Flexion = 4+/5                          Abduction = 4+/5              MET; 5/5 grossly      Functional Gains: lifting overhead, strength, core stability, return to the gym  Functional Deficits: none to report  % improvement: 75%  Pain   Average: 2/10                  Best: 2/10                Worst: 5/10  Patient Goal: \"to regain strength\"       Assessment/Summary of care: Ms. Liam Goyal has made excellent progress with PT, meeting all of her goals and reporting 75% improvement since evaluation. Pt pain has reduced and her strength has normalized, allowing her to improver her ability to lift overhead and return to the gym. Pt reports no functional deficits at this time. Pt has reached maximal therapeutic potential, therefore pt is DC to an updated HEP at this time.      RECOMMENDATIONS:  [x]Discontinue therapy: [x]Patient has reached or is progressing toward set goals      []Patient is non-compliant or has abdicated      []Due to lack of appreciable progress towards set [address], PT 12/11/2019 5:35 PM

## 2019-12-13 ENCOUNTER — APPOINTMENT (OUTPATIENT)
Dept: PHYSICAL THERAPY | Age: 39
End: 2019-12-13
Payer: COMMERCIAL

## 2020-01-09 ENCOUNTER — OFFICE VISIT (OUTPATIENT)
Dept: ORTHOPEDIC SURGERY | Age: 40
End: 2020-01-09

## 2020-01-09 VITALS
BODY MASS INDEX: 32.82 KG/M2 | WEIGHT: 197 LBS | TEMPERATURE: 98.6 F | HEART RATE: 75 BPM | HEIGHT: 65 IN | DIASTOLIC BLOOD PRESSURE: 71 MMHG | OXYGEN SATURATION: 100 % | SYSTOLIC BLOOD PRESSURE: 123 MMHG

## 2020-01-09 DIAGNOSIS — S13.4XXD WHIPLASH INJURY TO NECK, SUBSEQUENT ENCOUNTER: Primary | ICD-10-CM
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
## 2020-01-09 NOTE — PATIENT INSTRUCTIONS
Back Care and Preventing Injuries: Care Instructions  Your Care Instructions    You can hurt your back doing many everyday activities: lifting a heavy box, bending down to garden, exercising at the gym, and even getting out of bed. But you can keep your back strong and healthy by doing some exercises. You also can follow a few tips for sitting, sleeping, and lifting to avoid hurting your back again. Talk to your doctor before you start an exercise program. Ask for help if you want to learn more about keeping your back healthy. Follow-up care is a key part of your treatment and safety. Be sure to make and go to all appointments, and call your doctor if you are having problems. It's also a good idea to know your test results and keep a list of the medicines you take. How can you care for yourself at home? · Stay at a healthy weight to avoid strain on your lower back. · Do not smoke. Smoking increases the risk of osteoporosis, which weakens the spine. If you need help quitting, talk to your doctor about stop-smoking programs and medicines. These can increase your chances of quitting for good. · Make sure you sleep in a position that maintains your back's normal curves and on a mattress that feels comfortable. Sleep on your side with a pillow between your knees, or sleep on your back with a pillow under your knees. These positions can reduce strain on your back. · When you get out of bed, lie on your side and bend both knees. Drop your feet over the edge of the bed as you push up with both arms. Scoot to the edge of the bed. Make sure your feet are in line with your rear end (buttocks), and then stand up. · If you must stand for a long time, put one foot on a stool, ledge, or box. Exercise to strengthen your back and other muscles  · Get at least 30 minutes of exercise on most days of the week. Walking is a good choice.  You also may want to do other activities, such as running, swimming, cycling, or playing tennis or team sports. · Stretch your back muscles. Here are few exercises to try:  ? Lie on your back with your knees bent and your feet flat on the floor. Gently pull one bent knee to your chest. Put that foot back on the floor, and then pull the other knee to your chest. Hold for 15 to 30 seconds. Repeat 2 to 4 times. ? Do pelvic tilts. Lie on your back with your knees bent. Tighten your stomach muscles. Pull your belly button (navel) in and up toward your ribs. You should feel like your back is pressing to the floor and your hips and pelvis are slightly lifting off the floor. Hold for 6 seconds while breathing smoothly. · Keep your core muscles strong. The muscles of your back, belly (abdomen), and buttocks support your spine. ? Pull in your belly, and imagine pulling your navel toward your spine. Hold this for 6 seconds, then relax. Remember to keep breathing normally as you tense your muscles. ? Do curl-ups. Always do them with your knees bent. Keep your low back on the floor, and curl your shoulders toward your knees using a smooth, slow motion. Keep your arms folded across your chest. If this bothers your neck, try putting your hands behind your neck (not your head), with your elbows spread apart. ? Lie on your back with your knees bent and your feet flat on the floor. Tighten your belly muscles, and then push with your feet and raise your buttocks up a few inches. Hold this position 6 seconds as you continue to breathe normally, then lower yourself slowly to the floor. Repeat 8 to 12 times. ? If you like group exercise, try Pilates or yoga. These classes have poses that strengthen the core muscles. Protect your back when you sit  · Place a small pillow, a rolled-up towel, or a lumbar roll in the curve of your back if you need extra support. · Sit in a chair that is low enough to let you place both feet flat on the floor with both knees nearly level with your hips.  If your chair or desk is too high, use a foot rest to raise your knees. · When driving, keep your knees nearly level with your hips. Sit straight, and drive with both hands on the steering wheel. Your arms should be in a slightly bent position. · Try a kneeling chair, which helps tilt your hips forward. This takes pressure off your lower back. · Try sitting on an exercise ball. It can rock from side to side, which helps keep your back loose. Lift properly  · Squat down, bending at the hips and knees only. If you need to, put one knee to the floor and extend your other knee in front of you, bent at a right angle (half kneeling). · Press your chest straight forward. This helps keep your upper back straight while keeping a slight arch in your low back. · Hold the load as close to your body as possible, at the level of your navel. · Use your feet to change direction, taking small steps. · Lead with your hips as you change direction. Keep your shoulders in line with your hips as you move. Do not twist your body. · Set down your load carefully, squatting with your knees and hips only. When should you call for help? Watch closely for changes in your health, and be sure to contact your doctor if you have any problems. Where can you learn more? Go to http://leila-giles.info/. Enter S810 in the search box to learn more about \"Back Care and Preventing Injuries: Care Instructions. \"  Current as of: June 26, 2019  Content Version: 12.2  © 7413-9525 [employer] (acquired by [employer] in 2014). Care instructions adapted under license by [employer] (which disclaims liability or warranty for this information). If you have questions about a medical condition or this instruction, always ask your healthcare professional. [place] [address] any warranty or liability for your use of this information. Healthy Upper Back: Exercises  Introduction  Here are some examples of exercises for your upper back.  Start each exercise slowly. Ease off the exercise if you start to have pain. Your doctor or physical therapist will tell you when you can start these exercises and which ones will work best for you. How to do the exercises  Lower neck and upper back stretch    1. Stretch your arms out in front of your body. Clasp one hand on top of your other hand. 2. Gently reach out so that you feel your shoulder blades stretching away from each other. 3. Gently bend your head forward. 4. Hold for 15 to 30 seconds. 5. Repeat 2 to 4 times. Midback stretch    1. Kneel on the floor, and sit back on your ankles. 2. Lean forward, place your hands on the floor, and stretch your arms out in front of you. Rest your head between your arms. 3. Gently push your chest toward the floor, reaching as far in front of you as possible. 4. Hold for 15 to 30 seconds. 5. Repeat 2 to 4 times. Shoulder rolls    1. Sit comfortably with your feet shoulder-width apart. You can also do this exercise while standing. 2. Roll your shoulders up, then back, and then down in a smooth, circular motion. 3. Repeat 2 to 4 times. Wall push-up    1. Stand against a wall with your feet about 12 to 24 inches back from the wall. If you feel any pain when you do this exercise, stand closer to the wall. 2. Place your hands on the wall slightly wider apart than your shoulders, and lean forward. 3. Gently lean your body toward the wall. Then push back to your starting position. Keep the motion smooth and controlled. 4. Repeat 8 to 12 times. Resisted shoulder blade squeeze    1. Sit or stand, holding the band in both hands in front of you. Keep your elbows close to your sides, bent at a 90-degree angle. Your palms should face up. 2. Squeeze your shoulder blades together, and move your arms to the outside, stretching the band. Be sure to keep your elbows at your sides while you do this. 3. Relax. 4. Repeat 8 to 12 times. Resisted rows    1.  Put the band around a solid object, such as a bedpost, at about waist level. Hold one end of the band in each hand. 2. With your elbows at your sides and bent to 90 degrees, pull the band back to move your shoulder blades toward each other. Return to the starting position. 3. Repeat 8 to 12 times. Follow-up care is a key part of your treatment and safety. Be sure to make and go to all appointments, and call your doctor if you are having problems. It's also a good idea to know your test results and keep a list of the medicines you take. Where can you learn more? Go to http://leila-giles.info/. Enter U559 in the search box to learn more about \"Healthy Upper Back: Exercises. \"  Current as of: June 26, 2019  Content Version: 12.2  © 9816-3089 [employer]. Care instructions adapted under license by [employer] (which disclaims liability or warranty for this information). If you have questions about a medical condition or this instruction, always ask your healthcare professional. [address] any warranty or liability for your use of this information. Learning About Stress  What is stress? Stress is what you feel when you have to handle more than you are used to. Stress is a fact of life for most people, and it affects everyone differently. What causes stress for you may not be stressful for someone else. A lot of things can cause stress. You may feel stress when you go on a job interview, take a test, or run a race. This kind of short-term stress is normal and even useful. It can help you if you need to work hard or react quickly. For example, stress can help you finish an important job on time. Stress also can last a long time. Long-term stress is caused by stressful situations or events. Examples of long-term stress include long-term health problems, ongoing problems at work, or conflicts in your family. Long-term stress can harm your health.   How does stress affect your health? When you are stressed, your body responds as though you are in danger. It makes hormones that speed up your heart, make you breathe faster, and give you a burst of energy. This is called the fight-or-flight stress response. If the stress is over quickly, your body goes back to normal and no harm is done. But if stress happens too often or lasts too long, it can have bad effects. Long-term stress can make you more likely to get sick, and it can make symptoms of some diseases worse. If you tense up when you are stressed, you may develop neck, shoulder, or low back pain. Stress is linked to high blood pressure and heart disease. Stress also harms your emotional health. It can make you rodgers, tense, or depressed. Your relationships may suffer, and you may not do well at work or school. What can you do to manage stress? How to relax your mind  · Write. It may help to write about things that are bothering you. This helps you find out how much stress you feel and what is causing it. When you know this, you can find better ways to cope. · Let your feelings out. Talk, laugh, cry, and express anger when you need to. Talking with friends, family, a counselor, or a member of the clergy about your feelings is a healthy way to relieve stress. · Do something you enjoy. For example, listen to music or go to a movie. Practice your hobby or do volunteer work. · Meditate. This can help you relax, because you are not worrying about what happened before or what may happen in the future. · Do guided imagery. Imagine yourself in any setting that helps you feel calm. You can use audiotapes, books, or a teacher to guide you. How to relax your body  · Do something active. Exercise or activity can help reduce stress. Walking is a great way to get started. Even everyday activities such as housecleaning or yard work can help. · Do breathing exercises.  For example:  ? From a standing position, bend forward from the waist with your knees slightly bent. Let your arms dangle close to the floor. ? Breathe in slowly and deeply as you return to a standing position. Roll up slowly and lift your head last.  ? Hold your breath for just a few seconds in the standing position. ? Breathe out slowly and bend forward from the waist.  · Try yoga or rubio chi. These techniques combine exercise and meditation. You may need some training at first to learn them. What can you do to prevent stress? · Manage your time. This helps you find time to do the things you want and need to do. · Get enough sleep. Your body recovers from the stresses of the day while you are sleeping. · Get support. Your family, friends, and community can make a difference in how you experience stress. Where can you learn more? Go to http://leila-giles.info/. Enter V142 in the search box to learn more about \"Learning About Stress. \"  Current as of: April 7, 2019  Content Version: 12.2  © 5681-7334 [employer]. Care instructions adapted under license by [employer] (which disclaims liability or warranty for this information). If you have questions about a medical condition or this instruction, always ask your healthcare professional. [address] any warranty or liability for your use of this information.

## 2020-02-06 ENCOUNTER — OFFICE VISIT (OUTPATIENT)
Dept: NEUROLOGY | Age: 40
End: 2020-02-06

## 2020-02-06 ENCOUNTER — DOCUMENTATION ONLY (OUTPATIENT)
Dept: NEUROLOGY | Age: 40
End: 2020-02-06

## 2020-02-06 VITALS
HEIGHT: 65 IN | WEIGHT: 196 LBS | SYSTOLIC BLOOD PRESSURE: 115 MMHG | BODY MASS INDEX: 32.65 KG/M2 | DIASTOLIC BLOOD PRESSURE: 70 MMHG | TEMPERATURE: 98.3 F | HEART RATE: 63 BPM | OXYGEN SATURATION: 97 %

## 2020-02-06 DIAGNOSIS — G44.319 ACUTE POST-TRAUMATIC HEADACHE, NOT INTRACTABLE: ICD-10-CM

## 2020-02-06 DIAGNOSIS — G44.219 EPISODIC TENSION-TYPE HEADACHE, NOT INTRACTABLE: Primary | ICD-10-CM

## 2020-02-06 NOTE — PROGRESS NOTES
Pt requested release of records to law [address], [facility] and scanned docs and confirmation in pt's chart.
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
## 2020-05-27 NOTE — PATIENT INSTRUCTIONS
Patient:   CRISPIN MCGRATH            MRN: [id number]            FIN: [id number]              Age:   72 years     Sex:  MALE     :  47   Associated Diagnoses:   None   Author:   TORI [facility] Infectious Disease Progress Note     Subjective   Patient seen and examined.  Interval notes, labs, and chart reviewed.  still c/o lower back pain, worse with movement.  still no BM x 9 days. denies any abd discomfort, nausea or vomiting  +BLE weakness  MRI Lspine concern for disc space infection with abscess. pt refusing surgical intervention- IR biopsy tomorrow   No c/o dysuria. zelaya in place with yellow urine. no fever/chills, SOB, or N/V/D  Anti-Infective Medications   IV Medications   DAPTOmycin,     600 mg IVPB Q24H  meropenem,     500 mg IVPB Q8H        Health Status   Allergies:    Allergies (1) Active Reaction  Glimepiride-Pioglitazone None Documented  Hydrochloride      Objective   Vitals between:   26-MAY-2020 13:04:16   TO   27-MAY-2020 13:04:16                   LAST RESULT MINIMUM MAXIMUM  Temperature 36.5 36.3 36.7  Heart Rate 72 65 72  Respiratory Rate 16 16 18  NISBP           144 122 144  NIDBP           75 60 75  NIMBP           98 83 98  SpO2                    96 95 97      General:  Alert and oriented, No acute distress, resting in bed, no distress  responding appropriately.    Eye:  Pupils are equal, round and reactive to light, Extraocular movements are intact, Normal conjunctiva.   HENT:  Normocephalic, Oral mucosa is moist, No pharyngeal erythema.   Neck:  Supple, Non-tender, No lymphadenopathy.    Respiratory:  Lungs are clear to auscultation, Respirations are non-labored, Breath sounds are equal, No chest wall tenderness.   Cardiovascular:  Normal rate, Regular rhythm, No murmur.    Gastrointestinal:  Soft, Non-tender, Non-distended, Normal bowel sounds, obese.   Genitourinary:  +zelaya in place .    Musculoskeletal     Normal range of motion.     Integumentary:  Warm,  Contusion: Care Instructions Your Care Instructions Contusion is the medical term for a bruise. It is the result of a direct blow or an impact, such as a fall. Contusions are common sports injuries. Most people think of a bruise as a black-and-blue spot. This happens when small blood vessels get torn and leak blood under the skin. But bones, muscles, and organs can also get bruised. This may damage deep tissues but not cause a bruise you can see. The doctor will do a physical exam to find the location of your contusion. You may also have tests to make sure you do not have a more serious injury, such as a broken bone or nerve damage. These may include X-rays or other imaging tests like a CT scan or MRI. Deep-tissue contusions may cause pain and swelling. But if there is no serious damage, they will often get better in a few weeks with home treatment. The doctor has checked you carefully, but problems can develop later. If you notice any problems or new symptoms, get medical treatment right away. Follow-up care is a key part of your treatment and safety. Be sure to make and go to all appointments, and call your doctor if you are having problems. It's also a good idea to know your test results and keep a list of the medicines you take. How can you care for yourself at home? · Put ice or a cold pack on the sore area for 10 to 20 minutes at a time to stop swelling. Put a thin cloth between the ice pack and your skin. · Be safe with medicines. Read and follow all instructions on the label. ? If the doctor gave you a prescription medicine for pain, take it as prescribed. ? If you are not taking a prescription pain medicine, ask your doctor if you can take an over-the-counter medicine. · If you can, prop up the sore area on pillows as much as possible for the next few days. Try to keep the sore area above the level of your heart. When should you call for help? Call your doctor now or seek immediate medical care if: 
  · Your pain gets worse.  
  · You have new or worse swelling.  
  · You have tingling, weakness, or numbness in the area near the contusion.  
  · The area near the contusion is cold or pale.  
 Watch closely for changes in your health, and be sure to contact your doctor if: 
  · You do not get better as expected. Where can you learn more? Go to http://leila-giles.info/. Enter V412 in the search box to learn more about \"Contusion: Care Instructions. \" Current as of: September 23, 2018 Content Version: 11.9 © 8891-2392 [facility], [place]. Care instructions adapted under license by [employer] (which disclaims liability or warranty for this information). If you have questions about a medical condition or this instruction, always ask your healthcare professional. [address] any warranty or liability for your use of this information. Dry, No rash, chronic B/L LE skin changes c/w venous stasis changes.   Neurologic:  Alert, Oriented, Cranial Nerves II-XII are grossly intact, tongue midline, facial symmetry  weakness BL LE  BL UE strength intact.    Psychiatric:  Cooperative, Appropriate mood & affect.    LINES  Peripheral Intravenous Wrist Left   Gauge: 20   Charted: 05/27/20 07:51  Inserted: 05/23/20   Days Since Insertion: 4 days  Indication of Use: Hydration  DRAINS AND TUBES  Urinary Catheter   Type:    Cath Size:    Charted: 05/27/20 07:51  Inserted: 05/25/20   Days Since Insertion: 2 days  Usage: Gross Hematuria        Results Review   Labs between:  26-MAY-2020 13:04 to 27-MAY-2020 13:04  CBC:                 WBC  HgB  Hct  Plt  MCV  RDW   27-MAY-2020 (H) 16.7  (L) 9.8  (L) 31.4  291  93.7  14.9   DIFF:                 Seg  Neutroph//ABS  Lymph//ABS  Mono//ABS  EOS/ABS  27-MAY-2020 94  // (H) 15.7  // (L) 0.2  // 0.8 // (L) 0.0   BMP:                 Na  Cl  BUN  Glu   27-MAY-2020 136  101  (H) 31  (H) 133                              K  CO2  Cr  Ca                              3.6  30  0.90  8.6   Other Chem:             Mg  Phos  Triglycerides  GGTP  DirectBili                                      POC GLU:                 Latest Result  Latest Date  Minimum  Min Date  Maximum  Max Date                             (H) 130  27-MAY-2020 (H) 130  27-MAY-2020 (H) 138  26-MAY-2020  COAG:                 INR  PT  PTT  Ddimer  Fibrinogen    27-MAY-2020     (H) 33                      Micro:  SARS-CoV-2 PCR (5/23):  not detected  Blood cx (5/23):  2 of 2 pos for Bacteroides Fragilis  UA/ucx (5/20):  LE-lrg, wbc >100, cx >100k VRE/Diphtheroids  Imaging:  CXR (5/23):  Left lower lobe consolidation and patchy airspace opacities throughout the right lung, predominantly in the periphery.  Findings concerning for either multifocal pneumonia or atypical viral pneumonitis.  KUB (5/24):  There are mildly dilated bowel loops which could be suggestive of  ileus or developing bowel obstruction.  Abdominal series including upright and decubitus views recommended.  Fecal material in the rectosigmoid likely fecal retention.  Degenerative changes in the lumbar spine and hip joints.  Lumbar spine x-ray (5/24):  1.  Severely limited examination due to demineralization and oblique positioning.  If there is concern for fracture, CT is recommended.  2.  Severe multilevel spondylosis and degenerative disc change.  3.  S-shaped and rotatory scoliotic curvature.  GB u/s (5/24):  1.  Cholelithiasis.  2.  Incidental right hydronephrosis.  LE ABIs (5/24):  IMPRESSION:  Marked \"dampening\" of the bilateral lower extremity pulse volume recordings, suggestive of the presence of significant lower extremity stenotic/occlusive changes; if indicated, further evaluation with Doppler Duplex arterial ultrasound may be of additional diagnostic assistance    CT T and Lspine:  IMPRESSION:  1. Marked distraction of the L4 and L5 vertebral bodies with widening of the intervertebral disc space measuring up to 3 cm anteriorly. No significant walter- or retro-listhesis. Severe L4-L5 facet arthritis with possible mild subluxation and chronic facet fractures. Severe stenoses of the central spinal canal and neural foramina at the L4-L5 level with probable mass effect upon the cauda equina nerves. The above-described L4-L5 vertebral body  distraction is of uncertain etiology. MRI of the lumbar spine may be helpful for further evaluation.  2. No acute compression fracture identified in the thoracic or lumbar spine. No definite evidence of discitis-osteomyelitis.    MRI L spine 5/26:  IMPRESSION:  1.  Expansion of the L4-L5 disc space with lobulated increased signal extending into the adjacent paraspinal/psoas musculature with erosion of the facet joints and slight erosion and abnormal marrow signal along the superior L5 endplate.  Findings are suspicious for an atypical appearance of discitis-osteomyelitis  with associated intramuscular abscesses.  Further evaluation with post-contrast MRI of the lumbar spine is suggested. Neurosurgical  consultation is suggested.  2.  Dorsal epidural thickening ascending from the lower thoracic spine to the S1 level and ventral epidural thickening extending from L3-S1 with heterogeneous signal suspicious for epidural phlegmon and abscess.  This results in moderate to severe spinal canal narrowing at L2-L3 and L5-S1 with moderate spinal canal narrowing at L3-L4 and L4-L5.  3.  Clumping of the cauda equina nerve roots, most compatible with arachnoiditis.  4.  Edematous changes along the L2-L3 endplates with distraction along the superior endplate of L3 which may represent a Schmorl's node versus an additional area of discitis-osteomyelitis.       Impression and Plan   73 y/o man, PMH - COPD (on 5L NC), afib (not on AC d/t hematuria), CAD (s/p CABG), HTN, h/o COVID-19 infection 3 wks prior to current [facility] admission.  To [facility] ED 5/20 d/t incomplete drainage of zelaya cath (replaced by home health RN on 5/19). Was flowing freely, but stopped draining.  Impression:  # L4-L5 discitis/osteomyelitis with associated intramuscular abscesses  # L3-S1 epidural phlegmon/abscess  #  Bacteroides fragilis bacteremia   #  VRE/Diphtheroids CA-UTI  #  Acute leukocytosis - secondary to above.  #  h/o COVID-19 infection (3 wks ago)  # R hydronephrosis    - incidental finding seen on US GB  #  COPD  #  Afib, HTN, CAD (s/p CABG)  #  CKD  #  Anemia  #  Constipation  Plan:  -  Continue Daptomycin IV for treatment VRE CA-UTI.  7 day course (5/24-5/30).  -  Continue Meropenem IV   - MRI L spine (+) L4/L5 discitis/osteomyelitis with associated intramuscular abscesses as well as L3-S1 epidural phlegmon/abscess  - NSG following, appreciate recs. Surgical intervention strongly encouaged but pt is currently refusing. Will be going for IR aspiration/bx tomorrow 5/28. cultures ordered.  -  Follow labs/vitals.  Monitor  wbc trend for improvement.  - urology following, apprciate recs. zelaya replaced 5/25  -  Monitor UO from zelaya cath.  -  Repeat BCx x2 to doc clearing- remain neg thus far  - f/u ct a/p to eval for possibe Intra-abd source althought likely source of bacteremia is Lspine  - extensive discussion with patient's Wife Anh on the phone. explained in extensive detail the imaging findings suggestive of discitis/osteomyelitis as well as abscesses and that it is unlikely this infection will resolve with longterm IV antibiotics alone. The receommended treatment is surgical intervention along with 8 weeks of IV abx. Pt and wife are reluctant for surgery given patient's age and co-morbidities. Explained to pt and  wife, without surgical intervention, pt is at risk for progressive neurological complications.  - pt will need picc line and at least 8 weeks of IV abx. modalities of picc line was discussed with patien  - bowel regimen per primary  - further recs as the case progresses  - prior notes, imaging and labs reviewed  -dw patient, rn, pts wife, attending and Neurosurgery PA  - discussed case/abx/plan of care wtih Dr Vanegas  - will follow  seen and examined. agree with panote  restin arousable  back pain   lung sclear  abd obese  chart reviewed  dw urology  mri noted  b fragilis baceremia  on daptomycin and meropenem  wbc dec   neurosurg on consult  will have aspiration /bx of lumbar spine tomorrow

## 2021-05-30 ENCOUNTER — HOSPITAL ENCOUNTER (EMERGENCY)
Age: 41
Discharge: HOME | End: 2021-05-30
Attending: EMERGENCY MEDICINE
Payer: MEDICAID

## 2021-05-30 VITALS
WEIGHT: 215 LBS | RESPIRATION RATE: 16 BRPM | TEMPERATURE: 99.1 F | OXYGEN SATURATION: 100 % | HEIGHT: 65 IN | DIASTOLIC BLOOD PRESSURE: 78 MMHG | SYSTOLIC BLOOD PRESSURE: 113 MMHG | HEART RATE: 71 BPM | BODY MASS INDEX: 35.82 KG/M2

## 2021-05-30 DIAGNOSIS — N76.0 BV (BACTERIAL VAGINOSIS): Primary | ICD-10-CM

## 2021-05-30 DIAGNOSIS — B96.89 BV (BACTERIAL VAGINOSIS): Primary | ICD-10-CM

## 2021-05-30 LAB
APPEARANCE UR: CLEAR
BILIRUB UR QL: NEGATIVE
COLOR UR: YELLOW
GLUCOSE UR STRIP.AUTO-MCNC: NEGATIVE MG/DL
HCG UR QL: NEGATIVE
HGB UR QL STRIP: NEGATIVE
KETONES UR QL STRIP.AUTO: NEGATIVE MG/DL
LEUKOCYTE ESTERASE UR QL STRIP.AUTO: NEGATIVE
NITRITE UR QL STRIP.AUTO: NEGATIVE
PH UR STRIP: 6 [PH] (ref 5–8)
PROT UR STRIP-MCNC: NEGATIVE MG/DL
SERVICE CMNT-IMP: NORMAL
SP GR UR REFRACTOMETRY: 1.02 (ref 1–1.03)
UROBILINOGEN UR QL STRIP.AUTO: 1 EU/DL (ref 0.2–1)
WET PREP GENITAL: NORMAL

## 2021-05-30 PROCEDURE — 87210 SMEAR WET MOUNT SALINE/INK: CPT

## 2021-05-30 PROCEDURE — 81003 URINALYSIS AUTO W/O SCOPE: CPT

## 2021-05-30 PROCEDURE — 99283 EMERGENCY DEPT VISIT LOW MDM: CPT

## 2021-05-30 PROCEDURE — 87491 CHLMYD TRACH DNA AMP PROBE: CPT

## 2021-05-30 PROCEDURE — 81025 URINE PREGNANCY TEST: CPT

## 2021-05-30 RX ORDER — METRONIDAZOLE 500 MG/1
500 TABLET ORAL 2 TIMES DAILY
Qty: 14 TABLET | Refills: 0 | Status: SHIPPED | OUTPATIENT
Start: 2021-05-30 | End: 2021-06-06

## 2021-05-30 NOTE — ED TRIAGE NOTES
Patient c/o pelvic pain x couple days. C/o vaginal discharge and urinary frequency x 2 weeks. Voices concerns that she has not had menstrual cycle since April 17.   She states negative home pregnancy test.  She states prior hx of tubal ligation using clamps in 2013

## 2021-05-30 NOTE — ED PROVIDER NOTES
3year-old female  status post tubal ligation presents for evaluation of several concerns. Patient notes vaginal discharge with mild itching and burning for more than a week. She is also noted some dysuria, urgency, and frequency. Lastly patient's LMP was 2021 and she is concerned about possible pregnancy. She has taken a home pregnancy test which was negative. Typically her cycles are very regular. Past Medical History:   Diagnosis Date    Headache     Lupus ([address]     Lupus ([facility])        Past Surgical History:   Procedure Laterality Date    HX  SECTION      HX TUBAL LIGATION           History reviewed. No pertinent family history. Social History     Socioeconomic History    Marital status: SINGLE     Spouse name: Not on file    Number of children: Not on file    Years of education: Not on file    Highest education level: Not on file   Occupational History    Not on file   Tobacco Use    Smoking status: Current Every Day Smoker    Smokeless tobacco: Never Used   Substance and Sexual Activity    Alcohol use: No    Drug use: No    Sexual activity: Not on file     Comment: tubal ligation   Other Topics Concern    Not on file   Social History Narrative    Not on file     Social Determinants of Health     Financial Resource Strain:     Difficulty of Paying Living Expenses:    Food Insecurity:     Worried About Running Out of Food in the Last Year:     [address] in the Last Year:    Transportation Needs:     Lack of Transportation (Medical):      Lack of Transportation (Non-Medical):    Physical Activity:     Days of Exercise per Week:     Minutes of Exercise per Session:    Stress:     Feeling of Stress :    Social Connections:     Frequency of Communication with Friends and Family:     Frequency of Social Gatherings with Friends and Family:     Attends Taoist Services:     Active Member of Clubs or Organizations:     Attends Club or Organization Meetings:     Marital Status:    Intimate Partner Violence:     Fear of Current or Ex-Partner:     Emotionally Abused:     Physically Abused:     Sexually Abused: ALLERGIES: Bactrim [sulfamethoprim]    Review of Systems   Constitutional: Negative for fever. Gastrointestinal: Negative for abdominal pain. Genitourinary: Positive for dysuria, menstrual problem and vaginal discharge. All other systems reviewed and are negative. Vitals:    05/30/21 0946   BP: 113/78   Pulse: 71   Resp: 16   Temp: 99.1 °F (37.3 °C)   SpO2: 100%   Weight: 97.5 kg (215 lb)   Height: 5' 5\" (1.651 m)            Physical Exam  Vitals and nursing note reviewed. Constitutional:       General: She is not in acute distress. Appearance: She is well-developed. HENT:      Head: Normocephalic and atraumatic. Eyes:      General: No scleral icterus. Cardiovascular:      Rate and Rhythm: Normal rate. Pulmonary:      Effort: Pulmonary effort is normal.   Abdominal:      Comments: Mild left flank tenderness as well as suprapubic tenderness. No right lower quadrant tenderness. No mass, rebound, or guarding. Skin:     General: Skin is warm and dry. Neurological:      Mental Status: She is alert and oriented to person, place, and time.         Recent Results (from the past 12 hour(s))   URINALYSIS W/ RFLX MICROSCOPIC    Collection Time: 05/30/21  9:58 AM   Result Value Ref Range    Color YELLOW      Appearance CLEAR      Specific gravity 1.019 1.005 - 1.030      pH (UA) 6.0 5.0 - 8.0      Protein Negative NEG mg/dL    Glucose Negative NEG mg/dL    Ketone Negative NEG mg/dL    Bilirubin Negative NEG      Blood Negative NEG      Urobilinogen 1.0 0.2 - 1.0 EU/dL    Nitrites Negative NEG      Leukocyte Esterase Negative NEG     HCG URINE, QL    Collection Time: 05/30/21  9:58 AM   Result Value Ref Range    HCG urine, QL Negative NEG     WET PREP    Collection Time: 05/30/21  9:58 AM    Specimen: Vaginal Specimen   Result Value Ref Range    Special Requests: NO SPECIAL REQUESTS      Wet prep        MODERATE  CLUE CELLS PRESENT  NO YEAST SEEN  NO TRICHOMONAS SEEN         MDM  Number of Diagnoses or Management Options  BV (bacterial vaginosis)  Diagnosis management comments: Impression: Bacterial vaginosis. Negative urinalysis. Negative pregnancy. Procedures    Diagnosis:   1. BV (bacterial vaginosis)      1. Pregnancy test negative. 2.  Urinalysis negative. 3.  Wet prep positive for bacterial vaginosis (not an STD). Treatment is with Flagyl 500 mg twice daily for 7 days. 4.  Routine culture sent. You will be notified of positive result. 5.  Recommend repeat pregnancy test in 2 weeks. 6.  Follow-up with primary care physician for recheck in 5 to 7 days if not improved. Disposition: home    Follow-up Information     Follow up With Specialties Details Why Contact Info    Shmuel Hager MD Family Medicine Schedule an appointment as soon as possible for a visit in 1 week As needed, for recheck of ongoing symptoms [id number] [id number]  [phone]            Patient's Medications   Start Taking    METRONIDAZOLE (FLAGYL) 500 MG TABLET    Take 1 Tablet by mouth two (2) times a day for 7 days. Continue Taking    No medications on file   These Medications have changed    No medications on file   Stop Taking    NAPROXEN (NAPROSYN) 500 MG TABLET    Take 1 Tab by mouth two (2) times daily as needed for Pain. Take w/food    VARENICLINE TARTRATE (CHANTIX PO)    Take  by mouth.

## 2021-05-30 NOTE — DISCHARGE INSTRUCTIONS
1.  Pregnancy test negative. 2.  Urinalysis negative. 3.  Wet prep positive for bacterial vaginosis (not an STD). Treatment is with Flagyl 500 mg twice daily for 7 days. 4.  Routine culture sent. You will be notified of positive result. 5.  Recommend repeat pregnancy test in 2 weeks. 6.  Follow-up with primary care physician for recheck in 5 to 7 days if not improved.

## 2021-06-02 LAB
C TRACH RRNA SPEC QL NAA+PROBE: NEGATIVE
N GONORRHOEA RRNA SPEC QL NAA+PROBE: NEGATIVE
PLEASE NOTE:, 188601: NORMAL
SPECIMEN SOURCE: NORMAL

## 2022-12-12 ENCOUNTER — HOSPITAL ENCOUNTER (EMERGENCY)
Age: 42
Discharge: HOME | End: 2022-12-12
Attending: EMERGENCY MEDICINE
Payer: MEDICAID

## 2022-12-12 ENCOUNTER — APPOINTMENT (OUTPATIENT)
Dept: GENERAL RADIOLOGY | Age: 42
End: 2022-12-12
Attending: EMERGENCY MEDICINE
Payer: MEDICAID

## 2022-12-12 VITALS
BODY MASS INDEX: 35.82 KG/M2 | TEMPERATURE: 98.1 F | OXYGEN SATURATION: 100 % | WEIGHT: 215 LBS | SYSTOLIC BLOOD PRESSURE: 152 MMHG | DIASTOLIC BLOOD PRESSURE: 104 MMHG | HEIGHT: 65 IN | HEART RATE: 68 BPM | RESPIRATION RATE: 18 BRPM

## 2022-12-12 DIAGNOSIS — L03.012 CELLULITIS OF FINGER OF LEFT HAND: ICD-10-CM

## 2022-12-12 DIAGNOSIS — L03.012 PARONYCHIA OF FINGER OF LEFT HAND: Primary | ICD-10-CM

## 2022-12-12 PROCEDURE — 73130 X-RAY EXAM OF HAND: CPT

## 2022-12-12 PROCEDURE — 99283 EMERGENCY DEPT VISIT LOW MDM: CPT

## 2022-12-12 RX ORDER — IBUPROFEN 600 MG/1
600 TABLET ORAL
Qty: 20 TABLET | Refills: 0 | Status: SHIPPED | OUTPATIENT
Start: 2022-12-12

## 2022-12-12 RX ORDER — CLINDAMYCIN HYDROCHLORIDE 300 MG/1
300 CAPSULE ORAL 3 TIMES DAILY
Qty: 21 CAPSULE | Refills: 0 | Status: SHIPPED | OUTPATIENT
Start: 2022-12-12 | End: 2022-12-19

## 2022-12-12 NOTE — DISCHARGE INSTRUCTIONS
Take your antibiotic and follow close with your primary doctor and if there is any worsening please return to the emergency department immediately. If you notice that the tip of your finger changes colors for becomes any more swollen please return for reevaluation.

## 2022-12-12 NOTE — ED PROVIDER NOTES
EMERGENCY DEPARTMENT HISTORY AND PHYSICAL EXAM    3:20 PM      Date: 12/12/2022  Patient Name: Lacy Aviles    History of Presenting Illness     Chief Complaint   Patient presents with    Nail Problem         History Provided By: Patient  Location/Duration/Severity/Modifying factors   The patient is a 70-year-old female with a history of lupus the presents emergency department complaint of left middle digit pain this been progressing for the past week. Patient has artificial nails in place and started to have some pain and swelling there over the last week and had some numbness and tingling to the tip of her finger and realized something was wrong. She removed her nail, filed the nail down [place] and saw yellow pus along her nail base and put in pen and it is hard to drain. Patient said since been having some improving pain and less swelling however is concerned because there is some numbness to the tip of her finger. Patient denies any other discoloration. Patient denies ever having infections like this in the past.  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 Patient denies any other aggravating or alleviating factors. Patient denies taking any immunosuppressive medications. Patient does have a history of sulfa allergy. Patient is a smoker, denies alcohol use, denies drug use, denies any chance of being pregnant has had a tubal ligation. PCP: Janette Peguero MD    Current Outpatient Medications   Medication Sig Dispense Refill    ibuprofen (MOTRIN) 600 mg tablet Take 1 Tablet by mouth every six (6) hours as needed for Pain. 20 Tablet 0    clindamycin (CLEOCIN) 300 mg capsule Take 1 Capsule by mouth three (3) times daily for 7 days.  21 Capsule 0       Past History     Past Medical History:  Past Medical History:   Diagnosis Date    Headache     Lupus ([facility] [address]     Lupus ([facility] [address]        Past Surgical History:  Past Surgical History: Procedure Laterality Date    HX  SECTION      HX TUBAL LIGATION         Family History:  No family history on file. Social History:  Social History     Tobacco Use    Smoking status: Every Day    Smokeless tobacco: Never   Substance Use Topics    Alcohol use: No    Drug use: No       Allergies: Allergies   Allergen Reactions    Bactrim [Sulfamethoprim] Hives         Review of Systems       Review of Systems   Constitutional:  Negative for activity change, fatigue and fever. HENT:  Negative for congestion and rhinorrhea. Eyes:  Negative for visual disturbance. Respiratory:  Negative for shortness of breath. Cardiovascular:  Negative for chest pain and palpitations. Gastrointestinal:  Negative for abdominal pain, diarrhea, nausea and vomiting. Genitourinary:  Negative for dysuria and hematuria. Musculoskeletal:  Negative for back pain. Skin:  Positive for color change. Negative for rash. Neurological:  Negative for dizziness, weakness and light-headedness. All other systems reviewed and are negative. Physical Exam   Visit Vitals  BP (!) 152/104 (BP 1 Location: Left upper arm, BP Patient Position: Sitting)   Pulse 68   Temp 98.1 °F (36.7 °C)   Resp 18   Ht 5' 5\" (1.651 m)   Wt 97.5 kg (215 lb)   SpO2 100%   BMI 35.78 kg/m²         Physical Exam  Vitals and nursing note reviewed. Constitutional:       General: She is not in acute distress. Appearance: She is well-developed. HENT:      Head: Normocephalic and atraumatic. Right Ear: External ear normal.      Left Ear: External ear normal.      Nose: Nose normal.   Eyes:      General: No scleral icterus. Conjunctiva/sclera: Conjunctivae normal.      Pupils: Pupils are equal, round, and reactive to light. Neck:      Thyroid: No thyromegaly. Vascular: No JVD. Trachea: No tracheal deviation. Cardiovascular:      Rate and Rhythm: Normal rate. Heart sounds: Normal heart sounds. No murmur heard.     No friction rub. No gallop. Pulmonary:      Effort: Pulmonary effort is normal.   Chest:      Chest wall: No tenderness. Abdominal:      General: There is no distension. Musculoskeletal:         General: Tenderness present. Normal range of motion. Cervical back: Normal range of motion. Comments: Left middle digit with a file down the nail and erythema and pain along the cuticle with dried purulence noted, distal tip is soft and has good cap refill, full range of motion noted, no pain with passive extension, no fluctuance at the tip of the finger   Lymphadenopathy:      Cervical: No cervical adenopathy. Skin:     General: Skin is warm and dry. Findings: Erythema present. Neurological:      Mental Status: She is alert and oriented to person, place, and time. Cranial Nerves: No cranial nerve deficit. Coordination: Coordination normal.      Comments: No sensory loss, Gait normal, Motor 5/5   Psychiatric:         Behavior: Behavior normal.         Thought Content: Thought content normal.         Judgment: Judgment normal.         Diagnostic Study Results     Labs -  No results found for this or any previous visit (from the past 12 hour(s)). Radiologic Studies -   XR HAND LT MIN 3 V    (Results Pending)   No acute process interpreted by me      Medical Decision Making   I am the first provider for this patient. I reviewed the vital signs, available nursing notes, past medical history, past surgical history, family history and social history. Vital Signs-Reviewed the patient's vital signs. Records Reviewed: Nursing Notes, Old Medical Records, Previous Radiology Studies, and Previous Laboratory Studies (Time of Review: 3:20 PM)    ED Course: Progress Notes, Reevaluation, and Consults: Workup and recommendations were reviewed with the patient and all questions were answered. The patient understands the plan and will proceed with close outpatient care.   I have encouraged the patient to return if at all worsened or concerned. Enedina Soriano DO 3:25 PM      Provider Notes (Medical Decision Making):   MDM  Number of Diagnoses or Management Options  Cellulitis of finger of left hand  Paronychia of finger of left hand  Diagnosis management comments: Patient is a 45-year-old female with a history of lupus who presents emergency department with complaint of left middle digit pain and swelling that improved after taking her artificial nail off and poking what appeared to be a fluctuant area that she described yesterday. The patient has a erythematous finger without any evidence of felon and appears to have resolving paronychia that she assisted in draining over the last 24 hours. The patient has cellulitis and has full range of motion and no evidence of tenosynovitis will start clindamycin, pain control, follow close with her primary doctor and was educated on signs of worsening. An x-ray was done that showed no osteomyelitis. Procedures          Diagnosis     Clinical Impression:   1. Paronychia of finger of left hand    2. Cellulitis of finger of left hand        Disposition: DC    Follow-up Information       Follow up With Specialties Details Why Contact Info    Cristobal Herbert MD Family Medicine In 2 days  [address] EMERGENCY DEPT Emergency Medicine  As needed, If symptoms worsen [address] [facility]  [phone]             Patient's Medications   Start Taking    CLINDAMYCIN (CLEOCIN) 300 MG CAPSULE    Take 1 Capsule by mouth three (3) times daily for 7 days. IBUPROFEN (MOTRIN) 600 MG TABLET    Take 1 Tablet by mouth every six (6) hours as needed for Pain.    Continue Taking    No medications on file   These Medications have changed    No medications on file   Stop Taking    No medications on file     Disclaimer: Sections of this note are dictated using utilizing voice recognition software. Minor typographical errors may be present. If questions arise, please do not hesitate to contact me or call our department.

## 2022-12-12 NOTE — Clinical Note
[address] [facility] EMERGENCY DEPT  [address] [address] [phone]    Work/School Note    Date: 12/12/2022    To Whom It May concern: Jace Huynh was seen and treated today in the emergency room by the following provider(s):  Attending Provider: Mario Lamas MD.      Jace Huynh is excused from work/school on 12/12/22. She is clear to return to work/school on 12/13/22.         Sincerely,          Elliott Rocha MD

## 2022-12-12 NOTE — ED TRIAGE NOTES
Pt presents to ED with c/o nail infection in left middle finger. Reports having a filling completed 1 weeks ago and noticing tingling in area, states she removed the fake nail and noticed yellow drainage under the nail - reports poking nail last night and draining area. Area swollen and red.

## 2023-07-19 ENCOUNTER — HOSPITAL ENCOUNTER (EMERGENCY)
Facility: HOSPITAL | Age: 43
Discharge: HOME | End: 2023-07-19
Attending: EMERGENCY MEDICINE
Payer: MEDICAID

## 2023-07-19 VITALS
RESPIRATION RATE: 17 BRPM | OXYGEN SATURATION: 100 % | SYSTOLIC BLOOD PRESSURE: 128 MMHG | DIASTOLIC BLOOD PRESSURE: 87 MMHG | TEMPERATURE: 98.8 F | HEART RATE: 75 BPM | WEIGHT: 199 LBS | HEIGHT: 65 IN | BODY MASS INDEX: 33.15 KG/M2

## 2023-07-19 DIAGNOSIS — M54.50 ACUTE LEFT-SIDED LOW BACK PAIN WITHOUT SCIATICA: Primary | ICD-10-CM

## 2023-07-19 LAB
ANION GAP SERPL CALC-SCNC: 4 MMOL/L (ref 3–18)
APPEARANCE UR: CLEAR
BASOPHILS # BLD: 0.1 K/UL (ref 0–0.1)
BASOPHILS NFR BLD: 1 % (ref 0–2)
BILIRUB UR QL: NEGATIVE
BUN SERPL-MCNC: 18 MG/DL (ref 7–18)
BUN/CREAT SERPL: 12 (ref 12–20)
CALCIUM SERPL-MCNC: 9 MG/DL (ref 8.5–10.1)
CHLORIDE SERPL-SCNC: 109 MMOL/L (ref 100–111)
CO2 SERPL-SCNC: 24 MMOL/L (ref 21–32)
COLOR UR: YELLOW
CREAT SERPL-MCNC: 1.49 MG/DL (ref 0.6–1.3)
DIFFERENTIAL METHOD BLD: ABNORMAL
EOSINOPHIL # BLD: 0.2 K/UL (ref 0–0.4)
EOSINOPHIL NFR BLD: 3 % (ref 0–5)
ERYTHROCYTE [DISTWIDTH] IN BLOOD BY AUTOMATED COUNT: 13.4 % (ref 11.6–14.5)
GLUCOSE SERPL-MCNC: 100 MG/DL (ref 74–99)
GLUCOSE UR STRIP.AUTO-MCNC: NEGATIVE MG/DL
HCT VFR BLD AUTO: 36.6 % (ref 35–45)
HGB BLD-MCNC: 12.2 G/DL (ref 12–16)
HGB UR QL STRIP: NEGATIVE
IMM GRANULOCYTES # BLD AUTO: 0 K/UL (ref 0–0.04)
IMM GRANULOCYTES NFR BLD AUTO: 1 % (ref 0–0.5)
KETONES UR QL STRIP.AUTO: NEGATIVE MG/DL
LEUKOCYTE ESTERASE UR QL STRIP.AUTO: NEGATIVE
LYMPHOCYTES # BLD: 2.8 K/UL (ref 0.9–3.6)
LYMPHOCYTES NFR BLD: 37 % (ref 21–52)
MCH RBC QN AUTO: 30.5 PG (ref 24–34)
MCHC RBC AUTO-ENTMCNC: 33.3 G/DL (ref 31–37)
MCV RBC AUTO: 91.5 FL (ref 78–100)
MONOCYTES # BLD: 0.6 K/UL (ref 0.05–1.2)
MONOCYTES NFR BLD: 7 % (ref 3–10)
NEUTS SEG # BLD: 4 K/UL (ref 1.8–8)
NEUTS SEG NFR BLD: 52 % (ref 40–73)
NITRITE UR QL STRIP.AUTO: NEGATIVE
NRBC # BLD: 0 K/UL (ref 0–0.01)
NRBC BLD-RTO: 0 PER 100 WBC
PH UR STRIP: 6 (ref 5–8)
PLATELET # BLD AUTO: 240 K/UL (ref 135–420)
PMV BLD AUTO: 10.5 FL (ref 9.2–11.8)
POTASSIUM SERPL-SCNC: 3.7 MMOL/L (ref 3.5–5.5)
PROT UR STRIP-MCNC: NEGATIVE MG/DL
RBC # BLD AUTO: 4 M/UL (ref 4.2–5.3)
SODIUM SERPL-SCNC: 137 MMOL/L (ref 136–145)
SP GR UR REFRACTOMETRY: 1.01 (ref 1–1.03)
UROBILINOGEN UR QL STRIP.AUTO: 1 EU/DL (ref 0.2–1)
WBC # BLD AUTO: 7.6 K/UL (ref 4.6–13.2)

## 2023-07-19 PROCEDURE — 81003 URINALYSIS AUTO W/O SCOPE: CPT

## 2023-07-19 PROCEDURE — 99283 EMERGENCY DEPT VISIT LOW MDM: CPT

## 2023-07-19 PROCEDURE — 80048 BASIC METABOLIC PNL TOTAL CA: CPT

## 2023-07-19 PROCEDURE — 85025 COMPLETE CBC W/AUTO DIFF WBC: CPT

## 2023-07-19 RX ORDER — TRAMADOL HYDROCHLORIDE 50 MG/1
50 TABLET ORAL EVERY 4 HOURS PRN
Qty: 12 TABLET | Refills: 0 | Status: SHIPPED | OUTPATIENT
Start: 2023-07-19 | End: 2023-07-24

## 2023-07-19 ASSESSMENT — PAIN - FUNCTIONAL ASSESSMENT: PAIN_FUNCTIONAL_ASSESSMENT: 0-10

## 2023-07-19 ASSESSMENT — ENCOUNTER SYMPTOMS
GASTROINTESTINAL NEGATIVE: 1
RESPIRATORY NEGATIVE: 1
BACK PAIN: 1

## 2023-07-19 ASSESSMENT — PAIN SCALES - GENERAL: PAINLEVEL_OUTOF10: 7

## 2023-07-19 NOTE — ED TRIAGE NOTES
Pt ambulatory to triage c/o lower back pain x 2-3 days that occasionally radiates leg to mid thigh. 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  I have not been officially dx with anything\"

## 2023-07-20 NOTE — ED PROVIDER NOTES
[facility] EMERGENCY DEPT  eMERGENCY dEPARTMENT eNCOUnter      Pt Name: iL Raines  MRN: [id number]  [address] [facility] 1980 of evaluation: 2023  Provider:Etienne Miller MD    CHIEF COMPLAINT       HPI    Li Raines is a 43 y.o. female  c/o having lower back pain for a few weeks. She saw her PCP 1 week ago and had a blood work and U/a done. She states she had an abdnormal kidney function test and was referred to a nephrologist.  She was unable to get an appointment until next month. However, she continue to have left lower back pain. ROS    Review of Systems   Constitutional: Negative. HENT: Negative. Respiratory: Negative. Cardiovascular: Negative. Gastrointestinal: Negative. Genitourinary:  Positive for dysuria. Negative for difficulty urinating and pelvic pain. Musculoskeletal:  Positive for back pain (left CVA area). Skin: Negative. Neurological: Negative. All other systems reviewed and are negative. Except as noted above the remainder of the review of systems was reviewed and negative. PAST MEDICAL HISTORY     Past Medical History:   Diagnosis Date    Headache     Lupus ([address])     Lupus ([address])        SURGICAL HISTORY       Past Surgical History:   Procedure Laterality Date     SECTION      TUBAL LIGATION         CURRENTMEDICATIONS       Previous Medications    IBUPROFEN (ADVIL;MOTRIN) 600 MG TABLET    Take 600 mg by mouth every 6 hours as needed       ALLERGIES     Sulfamethoxazole-trimethoprim    FAMILY HISTORY     History reviewed. No pertinent family history.        SOCIAL HISTORY       Social History     Socioeconomic History    Marital status: Single     Spouse name: None    Number of children: None    Years of education: None    Highest education level: None   Tobacco Use    Smoking status: Every Day    Smokeless tobacco: Never   Substance and Sexual Activity    Alcohol use: No    Drug use: No         PHYSICAL EXAM       ED Triage Vitals [23

## 2023-07-20 NOTE — ED NOTES
Discharge instructions reviewed with patient. Patient verbalized understanding. Patient advised to follow up as directed on discharge instructions. Patient denies questions, needs or concerns at this time. Patient verbalized understanding. No s/sx of distress noted.        Mireya Bai RN  07/19/23 [id number]

## 2024-04-01 ENCOUNTER — HOSPITAL ENCOUNTER (EMERGENCY)
Facility: HOSPITAL | Age: 44
Discharge: HOME | End: 2024-04-01
Payer: COMMERCIAL

## 2024-04-01 ENCOUNTER — APPOINTMENT (OUTPATIENT)
Facility: HOSPITAL | Age: 44
End: 2024-04-01
Payer: COMMERCIAL

## 2024-04-01 VITALS
WEIGHT: 199 LBS | SYSTOLIC BLOOD PRESSURE: 135 MMHG | BODY MASS INDEX: 33.15 KG/M2 | HEART RATE: 60 BPM | RESPIRATION RATE: 18 BRPM | OXYGEN SATURATION: 100 % | TEMPERATURE: 98 F | DIASTOLIC BLOOD PRESSURE: 89 MMHG | HEIGHT: 65 IN

## 2024-04-01 DIAGNOSIS — M79.652 LEFT THIGH PAIN: ICD-10-CM

## 2024-04-01 DIAGNOSIS — M25.561 ACUTE PAIN OF RIGHT KNEE: ICD-10-CM

## 2024-04-01 DIAGNOSIS — M54.31 SCIATICA OF RIGHT SIDE: Primary | ICD-10-CM

## 2024-04-01 LAB
APPEARANCE UR: CLEAR
BILIRUB UR QL: NEGATIVE
COLOR UR: YELLOW
ECHO BSA: 2.03 M2
GLUCOSE UR STRIP.AUTO-MCNC: NEGATIVE MG/DL
HGB UR QL STRIP: NEGATIVE
KETONES UR QL STRIP.AUTO: NEGATIVE MG/DL
LEUKOCYTE ESTERASE UR QL STRIP.AUTO: NEGATIVE
NITRITE UR QL STRIP.AUTO: NEGATIVE
PH UR STRIP: 7 (ref 5–8)
PROT UR STRIP-MCNC: NEGATIVE MG/DL
SP GR UR REFRACTOMETRY: 1 (ref 1–1.03)
UROBILINOGEN UR QL STRIP.AUTO: 0.2 EU/DL (ref 0.2–1)

## 2024-04-01 PROCEDURE — 93971 EXTREMITY STUDY: CPT | Performed by: INTERNAL MEDICINE

## 2024-04-01 PROCEDURE — 6370000000 HC RX 637 (ALT 250 FOR IP): Performed by: NURSE PRACTITIONER

## 2024-04-01 PROCEDURE — 96372 THER/PROPH/DIAG INJ SC/IM: CPT

## 2024-04-01 PROCEDURE — 93971 EXTREMITY STUDY: CPT

## 2024-04-01 PROCEDURE — 6360000002 HC RX W HCPCS: Performed by: NURSE PRACTITIONER

## 2024-04-01 PROCEDURE — 99284 EMERGENCY DEPT VISIT MOD MDM: CPT

## 2024-04-01 PROCEDURE — 81003 URINALYSIS AUTO W/O SCOPE: CPT

## 2024-04-01 RX ORDER — KETOROLAC TROMETHAMINE 15 MG/ML
15 INJECTION, SOLUTION INTRAMUSCULAR; INTRAVENOUS ONCE
Status: DISCONTINUED | OUTPATIENT
Start: 2024-04-01 | End: 2024-04-01

## 2024-04-01 RX ORDER — METHOCARBAMOL 500 MG/1
500 TABLET, FILM COATED ORAL 4 TIMES DAILY
Qty: 40 TABLET | Refills: 0 | Status: SHIPPED | OUTPATIENT
Start: 2024-04-01 | End: 2024-04-11

## 2024-04-01 RX ORDER — KETOROLAC TROMETHAMINE 15 MG/ML
15 INJECTION, SOLUTION INTRAMUSCULAR; INTRAVENOUS ONCE
Status: COMPLETED | OUTPATIENT
Start: 2024-04-01 | End: 2024-04-01

## 2024-04-01 RX ORDER — KETOROLAC TROMETHAMINE 15 MG/ML
30 INJECTION, SOLUTION INTRAMUSCULAR; INTRAVENOUS ONCE
Status: DISCONTINUED | OUTPATIENT
Start: 2024-04-01 | End: 2024-04-01

## 2024-04-01 RX ORDER — GABAPENTIN 300 MG/1
300 CAPSULE ORAL 3 TIMES DAILY
COMMUNITY

## 2024-04-01 RX ORDER — METHOCARBAMOL 750 MG/1
750 TABLET, FILM COATED ORAL ONCE
Status: COMPLETED | OUTPATIENT
Start: 2024-04-01 | End: 2024-04-01

## 2024-04-01 RX ADMIN — KETOROLAC TROMETHAMINE 15 MG: 15 INJECTION, SOLUTION INTRAMUSCULAR; INTRAVENOUS at 12:21

## 2024-04-01 RX ADMIN — METHOCARBAMOL TABLETS 750 MG: 750 TABLET, COATED ORAL at 12:21

## 2024-04-01 ASSESSMENT — ENCOUNTER SYMPTOMS
SHORTNESS OF BREATH: 0
GASTROINTESTINAL NEGATIVE: 1

## 2024-04-01 ASSESSMENT — PAIN SCALES - GENERAL
PAINLEVEL_OUTOF10: 5
PAINLEVEL_OUTOF10: 8
PAINLEVEL_OUTOF10: 8
PAINLEVEL_OUTOF10: 7
PAINLEVEL_OUTOF10: 5

## 2024-04-01 ASSESSMENT — LIFESTYLE VARIABLES
HOW MANY STANDARD DRINKS CONTAINING ALCOHOL DO YOU HAVE ON A TYPICAL DAY: PATIENT DOES NOT DRINK
HOW OFTEN DO YOU HAVE A DRINK CONTAINING ALCOHOL: NEVER

## 2024-04-01 ASSESSMENT — PAIN - FUNCTIONAL ASSESSMENT: PAIN_FUNCTIONAL_ASSESSMENT: 0-10

## 2024-04-01 NOTE — ED NOTES
Discharge instructions reviewed with patient. Patient advised to follow up as directed on discharge instructions.  Patient denies questions, needs or concerns at this time.  Patient verbalized understanding. No s/sx of distress noted.     Patient ambulated wdl.

## 2024-04-01 NOTE — ED PROVIDER NOTES
sounds: Normal breath sounds.   Musculoskeletal:         General: No signs of injury. Normal range of motion.      Cervical back: Normal range of motion and neck supple. No tenderness.      Thoracic back: No tenderness.      Lumbar back: No tenderness.      Right upper leg: Normal.      Left upper leg: Normal.      Right knee: Normal.      Left knee: Normal.      Right lower leg: No edema.      Left lower leg: No edema.   Skin:     General: Skin is warm.      Findings: No rash.   Neurological:      Mental Status: She is alert and oriented to person, place, and time.      GCS: GCS eye subscore is 4. GCS verbal subscore is 5. GCS motor subscore is 6.      Sensory: Sensation is intact.      Motor: Motor function is intact.      Coordination: Coordination is intact.      Gait: Gait is intact.         SCREENINGS                 Diagnostic Study Results     Labs -  Recent Results (from the past 12 hour(s))   Urinalysis    Collection Time: 04/01/24 12:05 PM   Result Value Ref Range    Color, UA YELLOW      Appearance CLEAR      Specific Gravity, UA 1.005 1.005 - 1.030      pH, Urine 7.0 5.0 - 8.0      Protein, UA Negative NEG mg/dL    Glucose, UA Negative NEG mg/dL    Ketones, Urine Negative NEG mg/dL    Bilirubin Urine Negative NEG      Blood, Urine Negative NEG      Urobilinogen, Urine 0.2 0.2 - 1.0 EU/dL    Nitrite, Urine Negative NEG      Leukocyte Esterase, Urine Negative NEG     Vascular duplex lower extremity venous left    Collection Time: 04/01/24 12:45 PM   Result Value Ref Range    Body Surface Area 2.03 m2       EKG: All EKG's are interpreted by the Emergency Department Physician who either signs or Co-signs this chart in the absence of a cardiologist.    RADIOLOGY:   Non-plain film images such as CT, Ultrasound and MRI are read by the radiologist. Plain radiographic images are visualized and preliminarily interpreted by the emergency physician with the below findings:    Radiologic Studies -   Vascular duplex

## 2024-04-01 NOTE — DISCHARGE INSTRUCTIONS
Use lots of heat and stretching.    Sleep or left side with pillow between legs.  Take Tylenol or Tylenol PM as needed for pain.  Take medication as prescribed. Follow-up with your primary care physician or ortho within 2 days for reassessment. Bring the results from this visit with you for their review. Return to the ED immediately for any new, worsening, or persistent symptoms, including fever, vomiting, chest pain, shortness of breath, or any other medical concerns.

## 2024-04-01 NOTE — ED TRIAGE NOTES
Patient complains of right leg sciatica and left thigh warmness, no pain in right thigh, but 2nd toe is numb. Starting Friday  In physical therapy for the sciatica has fallen twice since 03/24/24 Right leg weak/tight, pain rating 8/10, MRI pending insurance approval, has seen orthopaedist.

## 2024-04-01 NOTE — ED NOTES
RN placed knee brace and gave education on use, patient demonstrated understanding by asking questions.

## 2024-05-02 ENCOUNTER — HOSPITAL ENCOUNTER (EMERGENCY)
Facility: HOSPITAL | Age: 44
Discharge: HOME | End: 2024-05-02
Attending: EMERGENCY MEDICINE
Payer: COMMERCIAL

## 2024-05-02 VITALS
SYSTOLIC BLOOD PRESSURE: 132 MMHG | DIASTOLIC BLOOD PRESSURE: 89 MMHG | TEMPERATURE: 98.8 F | BODY MASS INDEX: 32.99 KG/M2 | RESPIRATION RATE: 18 BRPM | WEIGHT: 198 LBS | HEART RATE: 83 BPM | OXYGEN SATURATION: 100 % | HEIGHT: 65 IN

## 2024-05-02 DIAGNOSIS — S05.01XA ABRASION OF RIGHT CORNEA, INITIAL ENCOUNTER: Primary | ICD-10-CM

## 2024-05-02 PROCEDURE — 99283 EMERGENCY DEPT VISIT LOW MDM: CPT

## 2024-05-02 PROCEDURE — 6370000000 HC RX 637 (ALT 250 FOR IP): Performed by: EMERGENCY MEDICINE

## 2024-05-02 PROCEDURE — 2500000003 HC RX 250 WO HCPCS: Performed by: EMERGENCY MEDICINE

## 2024-05-02 RX ORDER — TRAMADOL HYDROCHLORIDE 50 MG/1
50 TABLET ORAL EVERY 4 HOURS PRN
Qty: 12 TABLET | Refills: 0 | Status: SHIPPED | OUTPATIENT
Start: 2024-05-02 | End: 2024-05-05

## 2024-05-02 RX ORDER — ERYTHROMYCIN 5 MG/G
OINTMENT OPHTHALMIC
Status: COMPLETED | OUTPATIENT
Start: 2024-05-02 | End: 2024-05-02

## 2024-05-02 RX ORDER — ACETAMINOPHEN 500 MG
1000 TABLET ORAL
Status: COMPLETED | OUTPATIENT
Start: 2024-05-02 | End: 2024-05-02

## 2024-05-02 RX ORDER — ERYTHROMYCIN 5 MG/G
OINTMENT OPHTHALMIC
Qty: 1 EACH | Refills: 0 | Status: SHIPPED | OUTPATIENT
Start: 2024-05-02 | End: 2024-05-12

## 2024-05-02 RX ORDER — PROPARACAINE HYDROCHLORIDE 5 MG/ML
1 SOLUTION/ DROPS OPHTHALMIC
Status: COMPLETED | OUTPATIENT
Start: 2024-05-02 | End: 2024-05-02

## 2024-05-02 RX ADMIN — PROPARACAINE HYDROCHLORIDE 1 DROP: 5 SOLUTION/ DROPS OPHTHALMIC at 19:23

## 2024-05-02 RX ADMIN — ACETAMINOPHEN 1000 MG: 500 TABLET ORAL at 20:55

## 2024-05-02 RX ADMIN — ERYTHROMYCIN: 5 OINTMENT OPHTHALMIC at 20:55

## 2024-05-02 RX ADMIN — FLUORESCEIN SODIUM 1 MG: 1 STRIP OPHTHALMIC at 19:23

## 2024-05-02 ASSESSMENT — PAIN - FUNCTIONAL ASSESSMENT: PAIN_FUNCTIONAL_ASSESSMENT: 0-10

## 2024-05-02 ASSESSMENT — ENCOUNTER SYMPTOMS
EYE REDNESS: 1
EYE PAIN: 1
RESPIRATORY NEGATIVE: 1
PHOTOPHOBIA: 1

## 2024-05-02 ASSESSMENT — VISUAL ACUITY
OS: 20/15
OU: 20/13
OD: 20/13

## 2024-05-02 ASSESSMENT — PAIN DESCRIPTION - LOCATION: LOCATION: EYE

## 2024-05-02 ASSESSMENT — PAIN SCALES - GENERAL: PAINLEVEL_OUTOF10: 8

## 2024-05-02 ASSESSMENT — PAIN DESCRIPTION - ORIENTATION: ORIENTATION: RIGHT

## 2024-05-02 NOTE — ED TRIAGE NOTES
Pt reports onset of right eye pain upon waking this am. Using warm compresses all day and now notes slight redness. C/o some blurred vision to the right eye. Wears contacts; removed them and has not worn them x 2 days. Denies itching.

## 2024-05-03 NOTE — ED PROVIDER NOTES
`[facility] EMERGENCY DEPT  eMERGENCY dEPARTMENT eNCOUnter      Pt Name: Edith Toney  MRN: [id number]  Birthdate 1980 of evaluation: 2024  Provider:Etienne Hall MD    CHIEF COMPLAINT         HPI    Edith Toney is a 43 y.o. female  C/O having a sudden onset of right eye pain upon awaking this am. She states she has been using warm compresses all day and now notes slight redness in her right eye. She C/O having some blurred vision to the right eye. She wears contacts and removed them 2 days ago.  She has not worn them since taking them out. Denies itching.     ROS    Review of Systems   HENT: Negative.     Eyes:  Positive for photophobia (right), pain (right) and redness (right).   Respiratory: Negative.     Cardiovascular: Negative.    All other systems reviewed and are negative.      Except as noted above the remainder of the review of systems was reviewed and negative.       PAST MEDICAL HISTORY     Past Medical History:   Diagnosis Date    Chronic kidney disease     Headache     Lupus ([facility])          SURGICAL HISTORY       Past Surgical History:   Procedure Laterality Date     SECTION      TUBAL LIGATION           CURRENTMEDICATIONS       Previous Medications    GABAPENTIN (NEURONTIN) 300 MG CAPSULE    Take 1 capsule by mouth 3 times daily. As needed       ALLERGIES     Sulfamethoxazole-trimethoprim    FAMILY HISTORY     History reviewed. No pertinent family history.       SOCIAL HISTORY       Social History     Socioeconomic History    Marital status: Single     Spouse name: None    Number of children: None    Years of education: None    Highest education level: None   Tobacco Use    Smoking status: Former     Current packs/day: 0.50     Average packs/day: 0.5 packs/day for 26.3 years (13.2 ttl pk-yrs)     Types: Cigarettes     Start date:     Smokeless tobacco: Never   Vaping Use    Vaping Use: Every day   Substance and Sexual Activity    Alcohol use: Yes     Comment: special occasions

## 2024-05-10 ENCOUNTER — HOSPITAL ENCOUNTER (EMERGENCY)
Facility: HOSPITAL | Age: 44
Discharge: HOME | End: 2024-05-10
Payer: MEDICAID

## 2024-05-10 VITALS
TEMPERATURE: 97.8 F | DIASTOLIC BLOOD PRESSURE: 96 MMHG | OXYGEN SATURATION: 100 % | BODY MASS INDEX: 32.49 KG/M2 | WEIGHT: 195 LBS | SYSTOLIC BLOOD PRESSURE: 125 MMHG | HEART RATE: 76 BPM | HEIGHT: 65 IN | RESPIRATION RATE: 16 BRPM

## 2024-05-10 DIAGNOSIS — N76.0 BACTERIAL VAGINOSIS: ICD-10-CM

## 2024-05-10 DIAGNOSIS — B96.89 BACTERIAL VAGINOSIS: ICD-10-CM

## 2024-05-10 DIAGNOSIS — N18.32 STAGE 3B CHRONIC KIDNEY DISEASE (HCC): ICD-10-CM

## 2024-05-10 DIAGNOSIS — R30.0 DYSURIA: ICD-10-CM

## 2024-05-10 DIAGNOSIS — N30.01 ACUTE CYSTITIS WITH HEMATURIA: Primary | ICD-10-CM

## 2024-05-10 DIAGNOSIS — R35.0 URINARY FREQUENCY: ICD-10-CM

## 2024-05-10 LAB
ALBUMIN SERPL-MCNC: 3.6 G/DL (ref 3.4–5)
ALBUMIN/GLOB SERPL: 0.8 (ref 0.8–1.7)
ALP SERPL-CCNC: 52 U/L (ref 45–117)
ALT SERPL-CCNC: 24 U/L (ref 13–56)
ANION GAP SERPL CALC-SCNC: 5 MMOL/L (ref 3–18)
APPEARANCE UR: CLEAR
AST SERPL-CCNC: 20 U/L (ref 10–38)
BASOPHILS # BLD: 0 K/UL (ref 0–0.1)
BASOPHILS NFR BLD: 0 % (ref 0–2)
BILIRUB SERPL-MCNC: 0.4 MG/DL (ref 0.2–1)
BILIRUB UR QL: NEGATIVE
BUN SERPL-MCNC: 20 MG/DL (ref 7–18)
BUN/CREAT SERPL: 12 (ref 12–20)
C TRACH RRNA SPEC QL NAA+PROBE: NEGATIVE
CALCIUM SERPL-MCNC: 9.2 MG/DL (ref 8.5–10.1)
CHLORIDE SERPL-SCNC: 109 MMOL/L (ref 100–111)
CO2 SERPL-SCNC: 25 MMOL/L (ref 21–32)
COLOR UR: YELLOW
CREAT SERPL-MCNC: 1.71 MG/DL (ref 0.6–1.3)
DIFFERENTIAL METHOD BLD: NORMAL
EOSINOPHIL # BLD: 0.1 K/UL (ref 0–0.4)
EOSINOPHIL NFR BLD: 1 % (ref 0–5)
EPITH CASTS URNS QL MICRO: NORMAL /LPF (ref 0–5)
ERYTHROCYTE [DISTWIDTH] IN BLOOD BY AUTOMATED COUNT: 13.2 % (ref 11.6–14.5)
GLOBULIN SER CALC-MCNC: 4.4 G/DL (ref 2–4)
GLUCOSE SERPL-MCNC: 104 MG/DL (ref 74–99)
GLUCOSE UR STRIP.AUTO-MCNC: NEGATIVE MG/DL
HCG UR QL: NEGATIVE
HCT VFR BLD AUTO: 38.7 % (ref 35–45)
HGB BLD-MCNC: 12.9 G/DL (ref 12–16)
HGB UR QL STRIP: ABNORMAL
IMM GRANULOCYTES # BLD AUTO: 0 K/UL (ref 0–0.04)
IMM GRANULOCYTES NFR BLD AUTO: 0 % (ref 0–0.5)
KETONES UR QL STRIP.AUTO: NEGATIVE MG/DL
LEUKOCYTE ESTERASE UR QL STRIP.AUTO: ABNORMAL
LYMPHOCYTES # BLD: 2.1 K/UL (ref 0.9–3.6)
LYMPHOCYTES NFR BLD: 22 % (ref 21–52)
MCH RBC QN AUTO: 30.1 PG (ref 24–34)
MCHC RBC AUTO-ENTMCNC: 33.3 G/DL (ref 31–37)
MCV RBC AUTO: 90.4 FL (ref 78–100)
MONOCYTES # BLD: 0.7 K/UL (ref 0.05–1.2)
MONOCYTES NFR BLD: 7 % (ref 3–10)
N GONORRHOEA RRNA SPEC QL NAA+PROBE: NEGATIVE
NEUTS SEG # BLD: 6.5 K/UL (ref 1.8–8)
NEUTS SEG NFR BLD: 69 % (ref 40–73)
NITRITE UR QL STRIP.AUTO: NEGATIVE
NRBC # BLD: 0 K/UL (ref 0–0.01)
NRBC BLD-RTO: 0 PER 100 WBC
PH UR STRIP: 6.5 (ref 5–8)
PLATELET # BLD AUTO: 249 K/UL (ref 135–420)
PMV BLD AUTO: 10.5 FL (ref 9.2–11.8)
POTASSIUM SERPL-SCNC: 4.3 MMOL/L (ref 3.5–5.5)
PROT SERPL-MCNC: 8 G/DL (ref 6.4–8.2)
PROT UR STRIP-MCNC: ABNORMAL MG/DL
RBC # BLD AUTO: 4.28 M/UL (ref 4.2–5.3)
RBC #/AREA URNS HPF: NORMAL /HPF (ref 0–5)
SERVICE CMNT-IMP: NORMAL
SODIUM SERPL-SCNC: 139 MMOL/L (ref 136–145)
SP GR UR REFRACTOMETRY: 1.01 (ref 1–1.03)
SPECIMEN SOURCE: NORMAL
UROBILINOGEN UR QL STRIP.AUTO: 1 EU/DL (ref 0.2–1)
WBC # BLD AUTO: 9.5 K/UL (ref 4.6–13.2)
WBC URNS QL MICRO: NORMAL /HPF (ref 0–4)
WET PREP GENITAL: NORMAL

## 2024-05-10 PROCEDURE — 81025 URINE PREGNANCY TEST: CPT

## 2024-05-10 PROCEDURE — 96374 THER/PROPH/DIAG INJ IV PUSH: CPT

## 2024-05-10 PROCEDURE — 87210 SMEAR WET MOUNT SALINE/INK: CPT

## 2024-05-10 PROCEDURE — 87086 URINE CULTURE/COLONY COUNT: CPT

## 2024-05-10 PROCEDURE — 87491 CHLMYD TRACH DNA AMP PROBE: CPT

## 2024-05-10 PROCEDURE — 2580000003 HC RX 258: Performed by: PHYSICIAN ASSISTANT

## 2024-05-10 PROCEDURE — 81001 URINALYSIS AUTO W/SCOPE: CPT

## 2024-05-10 PROCEDURE — 85025 COMPLETE CBC W/AUTO DIFF WBC: CPT

## 2024-05-10 PROCEDURE — 6360000002 HC RX W HCPCS: Performed by: PHYSICIAN ASSISTANT

## 2024-05-10 PROCEDURE — 99284 EMERGENCY DEPT VISIT MOD MDM: CPT

## 2024-05-10 PROCEDURE — 80053 COMPREHEN METABOLIC PANEL: CPT

## 2024-05-10 PROCEDURE — 87591 N.GONORRHOEAE DNA AMP PROB: CPT

## 2024-05-10 RX ORDER — 0.9 % SODIUM CHLORIDE 0.9 %
1000 INTRAVENOUS SOLUTION INTRAVENOUS ONCE
Status: COMPLETED | OUTPATIENT
Start: 2024-05-10 | End: 2024-05-10

## 2024-05-10 RX ORDER — CIPROFLOXACIN 500 MG/1
500 TABLET, FILM COATED ORAL 2 TIMES DAILY
Qty: 14 TABLET | Refills: 0 | Status: SHIPPED | OUTPATIENT
Start: 2024-05-10 | End: 2024-05-17

## 2024-05-10 RX ORDER — METRONIDAZOLE 500 MG/1
500 TABLET ORAL 2 TIMES DAILY
Qty: 14 TABLET | Refills: 0 | Status: SHIPPED | OUTPATIENT
Start: 2024-05-10 | End: 2024-05-17

## 2024-05-10 RX ADMIN — WATER 1000 MG: 1 INJECTION INTRAMUSCULAR; INTRAVENOUS; SUBCUTANEOUS at 12:20

## 2024-05-10 RX ADMIN — SODIUM CHLORIDE 1000 ML: 9 INJECTION, SOLUTION INTRAVENOUS at 12:00

## 2024-05-10 ASSESSMENT — ENCOUNTER SYMPTOMS
DIARRHEA: 0
VOMITING: 0
SORE THROAT: 0
WHEEZING: 0
SHORTNESS OF BREATH: 0
ABDOMINAL PAIN: 0
EYE REDNESS: 0
NAUSEA: 0
EYE DISCHARGE: 0
COUGH: 0
CONSTIPATION: 0
RHINORRHEA: 0
BACK PAIN: 0

## 2024-05-10 ASSESSMENT — LIFESTYLE VARIABLES
HOW OFTEN DO YOU HAVE A DRINK CONTAINING ALCOHOL: MONTHLY OR LESS
HOW MANY STANDARD DRINKS CONTAINING ALCOHOL DO YOU HAVE ON A TYPICAL DAY: 1 OR 2

## 2024-05-10 ASSESSMENT — PAIN SCALES - GENERAL: PAINLEVEL_OUTOF10: 0

## 2024-05-10 ASSESSMENT — PAIN - FUNCTIONAL ASSESSMENT: PAIN_FUNCTIONAL_ASSESSMENT: NONE - DENIES PAIN

## 2024-05-10 NOTE — ED TRIAGE NOTES
Pt to ED for eval of burning with urination and blood in urine with urinary frequency x 2 days. Pt also reports unprotected sex with new partner. Hx of chronic kidney disease, has not seen nephrologist yet.

## 2024-05-10 NOTE — ED PROVIDER NOTES
[facility] EMERGENCY DEPT  EMERGENCY DEPARTMENT ENCOUNTER      Pt Name: Edith Toney  Pt Age: 43 y.o.  Sex: female   MRN: [id number]  CSN: [id number]   Birthdate 1980  Date of evaluation: 5/10/2024  Provider: BAILEY MUÑIZ  Room: [facility]08/08  Time Dictated: 12:20 PM    Chief Complaint   Chief Complaint   Patient presents with    Dysuria       History of Present Illness   The history is provided by the patient. No  was used.       11:00 AM  43 y.o. female presents to the ED C/O urinary frequency and urgency since yesterday with dysuria and hematuria today.  Patient reports that about a year ago her kidney function tests were not good indicating possible renal failure and her PCP sent her to a Nephrologist but she hasn't made the appointment yet. She has chronic Sciatica and has been seeing a chiropractor for that. No CVA pain, or new back pain since this started. She admits to having a new sexual partner and not using protection with him so she would like to be tested for STD's also. Patient has not been taking anything for the symptoms. No fever, chills, new back pain, flank pain, abdominal pain, N/V/D/C, chest pain, SOB, difficult breathing, wheezing, vaginal pain, vaginal discharge, vaginal bleeding, vaginal sores, vaginal odor, or vaginal itching. Her LMP was about 2 weeks ago.    Nursing Note reviewed    REVIEW OF SYSTEMS    (2-9 systems for level 4, 10 or more for level 5)   Review of Systems   Constitutional:  Negative for chills and fever.   HENT:  Negative for congestion, rhinorrhea and sore throat.    Eyes:  Negative for discharge and redness.   Respiratory:  Negative for cough, shortness of breath and wheezing.    Cardiovascular:  Negative for chest pain and palpitations.   Gastrointestinal:  Negative for abdominal pain, constipation, diarrhea, nausea and vomiting.   Genitourinary:  Positive for dysuria, frequency, hematuria and urgency.   Musculoskeletal:  Negative for back pain,

## 2024-05-11 LAB
BACTERIA SPEC CULT: ABNORMAL
CC UR VC: ABNORMAL
SERVICE CMNT-IMP: ABNORMAL

## 2024-10-09 NOTE — PROGRESS NOTES
[facility]  [address] [place] [address], [address]  [place] [address]  Phone: [phone]  Fax: [phone]        Edith Toney  : 1980  PCP: Anne Torres MD    NEW PATIENT SPINE EVALUATION      ASSESSMENT AND PLAN    Edith was seen today for lower back pain.    Diagnoses and all orders for this visit:    Right lumbar radiculitis  -     gabapentin (NEURONTIN) 300 MG capsule; Take 1 capsule by mouth 3 times daily for 90 days. As needed Max Daily Amount: 900 mg  -     EMG ONE EXTREMITY LOWER RT; Future    Lumbar facet arthropathy    Protrusion of lumbar intervertebral disc, left L2/3  -     gabapentin (NEURONTIN) 300 MG capsule; Take 1 capsule by mouth 3 times daily for 90 days. As needed Max Daily Amount: 900 mg    Stage 3 chronic kidney disease, unspecified whether stage 3a or 3b CKD ([facility])         Edith Toney is a 44 y.o. female, under treatment for presumed SLE, presenting with 8 months of acute right lumbosacral radicular pain.  Discordant MRI.  She has a contralateral left L2-L3 extrusion.  No left-sided symptomatology.  Significant RLE neuropathic pain on weightbearing. Failed PT, MDP, NI.   Discussed that medications for neuropathic pain need to be taken routinely, not as needed.  Rx gabapentin 300 mg ramping to 3 times daily as tolerated.  Do not combine with Robaxin.  No NSAIDs due to renal insufficiency, under investigation.  3.  She expresses desire to have her back fixed.  Would not recommend surgery at this time.  EDX right lower extremity.  Uncertain if this is a manifestation of SLE neuropathy.  Reassured patient that there is no evidence of cauda equina syndrome to explain her episodic urinary incontinence.    Follow-up and Dispositions    Return for EDx: LLE w/DA. fu w/me 6 weeks for med/EMG fu.         HISTORY OF PRESENT ILLNESS    Edith Toney is seen today in consultation for LBP radiating down RLE. Notes will be sent to PCP Anne Torres

## 2024-10-10 ENCOUNTER — OFFICE VISIT (OUTPATIENT)
Age: 44
End: 2024-10-10
Payer: COMMERCIAL

## 2024-10-10 VITALS
OXYGEN SATURATION: 97 % | HEIGHT: 65 IN | BODY MASS INDEX: 33.82 KG/M2 | HEART RATE: 86 BPM | SYSTOLIC BLOOD PRESSURE: 121 MMHG | TEMPERATURE: 97.8 F | DIASTOLIC BLOOD PRESSURE: 88 MMHG | WEIGHT: 203 LBS

## 2024-10-10 DIAGNOSIS — M51.26 PROTRUSION OF LUMBAR INTERVERTEBRAL DISC: ICD-10-CM

## 2024-10-10 DIAGNOSIS — M54.16 RIGHT LUMBAR RADICULITIS: Primary | ICD-10-CM

## 2024-10-10 DIAGNOSIS — M47.816 LUMBAR FACET ARTHROPATHY: ICD-10-CM

## 2024-10-10 DIAGNOSIS — N18.30 STAGE 3 CHRONIC KIDNEY DISEASE, UNSPECIFIED WHETHER STAGE 3A OR 3B CKD (HCC): ICD-10-CM

## 2024-10-10 PROCEDURE — 99204 OFFICE O/P NEW MOD 45 MIN: CPT | Performed by: PHYSICAL MEDICINE & REHABILITATION

## 2024-10-10 PROCEDURE — G8417 CALC BMI ABV UP PARAM F/U: HCPCS | Performed by: PHYSICAL MEDICINE & REHABILITATION

## 2024-10-10 PROCEDURE — G8484 FLU IMMUNIZE NO ADMIN: HCPCS | Performed by: PHYSICAL MEDICINE & REHABILITATION

## 2024-10-10 PROCEDURE — 1036F TOBACCO NON-USER: CPT | Performed by: PHYSICAL MEDICINE & REHABILITATION

## 2024-10-10 PROCEDURE — G8427 DOCREV CUR MEDS BY ELIG CLIN: HCPCS | Performed by: PHYSICAL MEDICINE & REHABILITATION

## 2024-10-10 RX ORDER — CLINDAMYCIN PHOSPHATE 20 MG/G
1 CREAM VAGINAL
COMMUNITY
Start: 2024-10-08 | End: 2024-10-15

## 2024-10-10 RX ORDER — TRAMADOL HYDROCHLORIDE 50 MG/1
50 TABLET ORAL 3 TIMES DAILY PRN
COMMUNITY

## 2024-10-10 RX ORDER — GABAPENTIN 300 MG/1
300 CAPSULE ORAL 3 TIMES DAILY
Qty: 90 CAPSULE | Refills: 2 | Status: SHIPPED | OUTPATIENT
Start: 2024-10-10 | End: 2025-01-08

## 2024-10-10 RX ORDER — HYDROXYCHLOROQUINE SULFATE 200 MG/1
200 TABLET, FILM COATED ORAL DAILY
COMMUNITY

## 2024-10-10 RX ORDER — FLUCONAZOLE 150 MG/1
150 TABLET ORAL DAILY
COMMUNITY
Start: 2019-12-09 | End: 2024-10-10

## 2024-10-10 SDOH — HEALTH STABILITY: PHYSICAL HEALTH: ON AVERAGE, HOW MANY DAYS PER WEEK DO YOU ENGAGE IN MODERATE TO STRENUOUS EXERCISE (LIKE A BRISK WALK)?: 0 DAYS

## 2024-10-10 NOTE — PROGRESS NOTES
Edith Toney presents today for   Chief Complaint   Patient presents with    Lower Back Pain       Is someone accompanying this pt? no    Is the patient using any DME equipment during OV? Yes, cane     Coordination of Care:  1. Have you been to the ER, urgent care clinic since your last visit? no  Hospitalized since your last visit? no    2. Have you seen or consulted any other health care providers outside of the [facility] since your last visit? Yes, pcp  Include any pap smears or colon screening. no

## 2024-10-10 NOTE — PATIENT INSTRUCTIONS
Eat to Reduce Inflammation      Eat more:  Fruits and Veggies  Leafy, green vegetables like spinach  Any veggies you love, especially onions and cabbage  Fruit, especially berries, apples, pears, and citrus  Avocado  Carbs  Beans and legumes  100% whole grain bread, oats, quinoa, brown and wild rice, millet, and corn  Proteins and Fats  Nuts and seeds, and nut butters (especially walnuts, flax seed, and jasbir seeds)  Olive oil  Fatty fish, such as salmon, tuna, sardines, and trout  Eggs (especially omega-3 eggs)  Small amounts of grass-fed meats  Herbs and Spices  Ginger and garlic (fresh or dried)  Spices, especially turmeric and cinnamon  Herbs, especially rosemary, thyme, and basil  Red chili peppers  Eat less:  Foods  Baked goods made with white flour, added sugar, and vegetables oils like white bread, cookies, cakes, or pastries  Frozen or boxed meals like pizza, macaroni and cheese, or seasoned pasta meals  Snack foods made with refined carbs or vegetable oils like crackers or chips  Foods high in added sugar like candy or ice cream  French fries and other fried foods  Drinks  Sugar sweetened beverages like soda, sports drinks, energy drinks, coffee drinks, or sweet tea  Alcohol (yes, even red wine!)            © 2022 [employer] All rights reserved.

## 2024-10-11 ENCOUNTER — PROCEDURE VISIT (OUTPATIENT)
Age: 44
End: 2024-10-11

## 2024-10-11 VITALS
TEMPERATURE: 98.1 F | OXYGEN SATURATION: 99 % | BODY MASS INDEX: 33.82 KG/M2 | HEIGHT: 65 IN | SYSTOLIC BLOOD PRESSURE: 130 MMHG | HEART RATE: 75 BPM | DIASTOLIC BLOOD PRESSURE: 81 MMHG | WEIGHT: 203 LBS

## 2024-10-11 DIAGNOSIS — R20.0 NUMBNESS AND TINGLING OF RIGHT LOWER EXTREMITY: Primary | ICD-10-CM

## 2024-10-11 DIAGNOSIS — R20.2 NUMBNESS AND TINGLING OF RIGHT LOWER EXTREMITY: Primary | ICD-10-CM

## 2024-10-11 NOTE — PROGRESS NOTES
[facility]  [facility], [address]  [place] [address]  Phone: [phone]  Fax: [phone]    Edith Toney  : 1980  PCP: Anne Torres MD  10/11/2024    ELECTROMYOGRAPHY AND NERVE CONDUCTION STUDIES    Edith Toney was referred by Dr. Starks for electrodiagnostic evaluation of numbness/tingling in right leg.    NCV & EMG Findings:  All remaining nerves (as indicated in the following tables) were within normal limits.    All examined muscles (as indicated in the following table) showed no evidence of electrical instability     INTERPRETATION  This was a normal nerve conduction and EMG study showing there to be no signs of neuropathy, myopathy, or radiculopathy in the nerves and muscles tested.         CLINICAL INTERPRETATION  Her electrodiagnostic findings do not appear to explain her right leg symptoms. There is no electrodiagnostic evidence of peripheral neuropathy at this time.       HISTORY OF PRESENT ILLNESS  Edith Toney is a 44 y.o. female.    Pt presents today with RLE EMG evaluation for numbness/tingling of right leg. Pt has hx of SLE.    PAST MEDICAL HISTORY   Past Medical History:   Diagnosis Date    Chronic kidney disease     Headache     Lupus        Past Surgical History:   Procedure Laterality Date     SECTION      TUBAL LIGATION         MEDICATIONS    Current Outpatient Medications   Medication Sig Dispense Refill    hydroxychloroquine (PLAQUENIL) 200 MG tablet Take 1 tablet by mouth daily      Magnesium Glycinate (MAGNESIUM GLYCINATE ADVANCED) 120 MG CAPS Take 1 tablet by mouth daily      CLEOCIN 2 % vaginal cream Place 1 Application vaginally      traMADol (ULTRAM) 50 MG tablet Take 1 tablet by mouth 3 times daily as needed for Pain.      gabapentin (NEURONTIN) 300 MG capsule Take 1 capsule by mouth 3 times daily for 90 days. As needed Max Daily Amount: 900 mg 90 capsule 2     No current facility-administered medications

## 2024-10-11 NOTE — PROGRESS NOTES
Edith Toney presents today for   Chief Complaint   Patient presents with    Pain     Emg lower left extremity       Is someone accompanying this pt? NO    Is the patient using any DME equipment during OV? YES    Depression Screening:       No data to display                   No data to display                Learning Assessment:  No question data found.    Abuse Screening:       No data to display                Fall Risk       No data to display                OPIOID RISK TOOL       No data to display                Coordination of Care:  1. Have you been to the ER, urgent care clinic since your last visit? NO  Hospitalized since your last visit? NO    2. Have you seen or consulted any other health care providers outside of the [facility] since your last visit? NO Include any pap smears or colon screening. NO

## 2024-11-01 ENCOUNTER — TELEPHONE (OUTPATIENT)
Age: 44
End: 2024-11-01

## 2024-11-01 NOTE — TELEPHONE ENCOUNTER
Call received from Olkesandr Rodriguez with [facility] requesting the patients work status.    Oleksandr is asking for a call back, and can be reached at [phone] ext. [phone]

## 2024-11-01 NOTE — TELEPHONE ENCOUNTER
Her Edx RLE was normal. MRI showed contralateral bulges. Based on above findings, recommend light duty. I did not take her OOW.

## 2024-11-01 NOTE — TELEPHONE ENCOUNTER
This pt seen by Dr. Starks's patient seen once as a new patient.  \"Unemployed due to health issues \".  I do not see a note or letter where Dr. Starks took her out of work for her back.

## 2024-11-04 NOTE — TELEPHONE ENCOUNTER
Attempted to contact Oleksandr regarding his message. He was not able to be reached. A message was left for him to contact the office at his convenience. The number to the office was provided. I called and spoke to the pt. She verified that she is dealing with [employer]. Her last ortho provider at [facility] put her out of work. However, they no longer take her insurance and that is why she had to come see Dr. Starks. I notified her of the restrictions that Dr. Starks is recommending for her when working. She verbalized understanding and is ok with this.

## 2024-11-06 NOTE — TELEPHONE ENCOUNTER
Attempted to contact Oleksandr again regarding his request. He was not able to be reached. A message was left again asking him to contact the office. There was no fax number provided for the letter requested. Our office number was provided.

## 2024-11-06 NOTE — TELEPHONE ENCOUNTER
The pt called the office back and was able to provide a fax number for Oleksandr. The restriction letter was reprinted and faxed over to [phone]. A fax confirmation was received.

## 2024-11-07 NOTE — TELEPHONE ENCOUNTER
Pt noticed that noted on pt's AVS that the injury was not work related and pt states that it was and would like this to be corrected.     I did find out during this conversation that pt is on worker's comp for her back and that she will be having Oleksandr, her  to give us a call to give the insurance information for her worker's comp case.    Pt was unaware that this information was needed being that she was previously seen by another provider for her back before she began seeing Dr. Starks. Pt is now aware of the process going forward.    callback # [phone]

## 2024-12-09 ENCOUNTER — OFFICE VISIT (OUTPATIENT)
Age: 44
End: 2024-12-09
Payer: MEDICAID

## 2024-12-09 VITALS
DIASTOLIC BLOOD PRESSURE: 79 MMHG | HEIGHT: 65 IN | WEIGHT: 215.2 LBS | SYSTOLIC BLOOD PRESSURE: 138 MMHG | BODY MASS INDEX: 35.85 KG/M2 | OXYGEN SATURATION: 100 % | HEART RATE: 78 BPM | RESPIRATION RATE: 18 BRPM

## 2024-12-09 DIAGNOSIS — M51.26 PROTRUSION OF LUMBAR INTERVERTEBRAL DISC: ICD-10-CM

## 2024-12-09 DIAGNOSIS — R20.0 NUMBNESS AND TINGLING OF RIGHT LOWER EXTREMITY: Primary | ICD-10-CM

## 2024-12-09 DIAGNOSIS — M79.2 PERIPHERAL NEURALGIA: ICD-10-CM

## 2024-12-09 DIAGNOSIS — M47.816 LUMBAR FACET ARTHROPATHY: ICD-10-CM

## 2024-12-09 DIAGNOSIS — R20.2 NUMBNESS AND TINGLING OF RIGHT LOWER EXTREMITY: Primary | ICD-10-CM

## 2024-12-09 PROCEDURE — 99214 OFFICE O/P EST MOD 30 MIN: CPT | Performed by: PHYSICAL MEDICINE & REHABILITATION

## 2024-12-09 RX ORDER — PREGABALIN 100 MG/1
100 CAPSULE ORAL 3 TIMES DAILY
Qty: 90 CAPSULE | Refills: 2 | Status: SHIPPED | OUTPATIENT
Start: 2024-12-09 | End: 2025-03-09

## 2024-12-09 RX ORDER — LOSARTAN POTASSIUM 25 MG/1
25 TABLET ORAL DAILY
COMMUNITY
Start: 2024-11-22

## 2024-12-09 NOTE — PROGRESS NOTES
Edith TENISHA Toney presents today for   Chief Complaint   Patient presents with    Back Problem    Pain    Back Pain       Is someone accompanying this pt? no    Is the patient using any DME equipment during OV? no    Depression Screening:       No data to display                Learning Assessment:  Failed to redirect to the Timeline version of the [employer] SmartLink.    Abuse Screening:       No data to display                Fall Risk  Failed to redirect to the Timeline version of the [employer] SmartLink.    OPIOID RISK TOOL  Failed to redirect to the Timeline version of the [employer] SmartLink.    Coordination of Care:  1. Have you been to the ER, urgent care clinic since your last visit? no  Hospitalized since your last visit? no    2. Have you seen or consulted any other health care providers outside of the [facility] since your last visit? no Include any pap smears or colon screening. no      
in this documentation. I have authorized the scribe to complete the medical record entries input within this chart. I have reviewed the chart and agree that the record reflects my personal performance and is accurate and complete. [Electronically Signed: Ginny Starks MD. 12/9/2024 3:15 PM ]    Portions of this note was created using Dragon transcription software and may contain unintended errors.

## 2024-12-13 ENCOUNTER — TELEPHONE (OUTPATIENT)
Age: 44
End: 2024-12-13

## 2024-12-13 DIAGNOSIS — M79.2 PERIPHERAL NEURALGIA: ICD-10-CM

## 2024-12-13 DIAGNOSIS — M47.816 LUMBAR FACET ARTHROPATHY: Primary | ICD-10-CM

## 2024-12-13 NOTE — TELEPHONE ENCOUNTER
Pt called to ask if Dr. Starks could order her a walker because pt can't really walk and does not feel safe w/ just a cane.    Last seen- 12/09/2024    callback [phone]

## 2024-12-16 NOTE — TELEPHONE ENCOUNTER
Pt was contacted and notified of the order for the rollator walker. The pt was identified using 2 pt identifiers. Pt asked if her daughter can  the order. No one is listed on pt's HIPAA form. Pt advised that the order can only be picked up by her. Pt was also advised that she will need to contact her insurance company to see who is in network with her insurance. She verbalized understanding and has no questions or concerns at this time. Order placed at  for pt.

## 2024-12-16 NOTE — TELEPHONE ENCOUNTER
Patient called back and states that she has not started PT due to the first available is not until 12/30/24, and patient is requesting a call back has a few question about the previous message.        Please call and advise patient at  [phone]

## 2024-12-16 NOTE — TELEPHONE ENCOUNTER
I called and spoke to the pt. The pt was identified using 2 pt identifiers. She states that the pain is getting worse and the walker was the only thing that she was able to get around on. She does not think the weight loss referral is necessary at this time. Pt's first appt with PT not until 12/30 because of their availability. Message forwarded back to Dr. Starks for review.

## 2024-12-16 NOTE — TELEPHONE ENCOUNTER
Last visit, PT re- ordered. Has she started? Where? Would appreciate PT input regarding walker.     She has some arthritis on her MRI, a normal EMG, she does not need back surgery. Injection no benefit.     Does she want a referral to a weight loss clinic?

## 2024-12-17 ENCOUNTER — APPOINTMENT (OUTPATIENT)
Facility: HOSPITAL | Age: 44
End: 2024-12-17
Payer: MEDICAID

## 2024-12-17 ENCOUNTER — HOSPITAL ENCOUNTER (EMERGENCY)
Facility: HOSPITAL | Age: 44
Discharge: HOME | End: 2024-12-20
Attending: STUDENT IN AN ORGANIZED HEALTH CARE EDUCATION/TRAINING PROGRAM
Payer: MEDICAID

## 2024-12-17 DIAGNOSIS — S39.012A STRAIN OF LUMBAR REGION, INITIAL ENCOUNTER: ICD-10-CM

## 2024-12-17 DIAGNOSIS — M54.31 SCIATICA OF RIGHT SIDE: Primary | ICD-10-CM

## 2024-12-17 DIAGNOSIS — R29.898 WEAKNESS OF RIGHT LOWER EXTREMITY: ICD-10-CM

## 2024-12-17 DIAGNOSIS — R74.8 ELEVATED CK: ICD-10-CM

## 2024-12-17 LAB
ANION GAP SERPL CALC-SCNC: 4 MMOL/L (ref 3–18)
BASOPHILS # BLD: 0 K/UL (ref 0–0.1)
BASOPHILS NFR BLD: 1 % (ref 0–2)
BUN SERPL-MCNC: 21 MG/DL (ref 7–18)
BUN/CREAT SERPL: 14 (ref 12–20)
CALCIUM SERPL-MCNC: 9.6 MG/DL (ref 8.5–10.1)
CHLORIDE SERPL-SCNC: 111 MMOL/L (ref 100–111)
CK SERPL-CCNC: 683 U/L (ref 26–192)
CO2 SERPL-SCNC: 25 MMOL/L (ref 21–32)
CREAT SERPL-MCNC: 1.54 MG/DL (ref 0.6–1.3)
DIFFERENTIAL METHOD BLD: NORMAL
EOSINOPHIL # BLD: 0.1 K/UL (ref 0–0.4)
EOSINOPHIL NFR BLD: 2 % (ref 0–5)
ERYTHROCYTE [DISTWIDTH] IN BLOOD BY AUTOMATED COUNT: 13 % (ref 11.6–14.5)
GLUCOSE SERPL-MCNC: 95 MG/DL (ref 74–99)
HCT VFR BLD AUTO: 38.9 % (ref 35–45)
HGB BLD-MCNC: 12.4 G/DL (ref 12–16)
IMM GRANULOCYTES # BLD AUTO: 0 K/UL (ref 0–0.04)
IMM GRANULOCYTES NFR BLD AUTO: 0 % (ref 0–0.5)
LYMPHOCYTES # BLD: 2.4 K/UL (ref 0.9–3.6)
LYMPHOCYTES NFR BLD: 36 % (ref 21–52)
MCH RBC QN AUTO: 28 PG (ref 24–34)
MCHC RBC AUTO-ENTMCNC: 31.9 G/DL (ref 31–37)
MCV RBC AUTO: 87.8 FL (ref 78–100)
MONOCYTES # BLD: 0.6 K/UL (ref 0.05–1.2)
MONOCYTES NFR BLD: 9 % (ref 3–10)
NEUTS SEG # BLD: 3.5 K/UL (ref 1.8–8)
NEUTS SEG NFR BLD: 52 % (ref 40–73)
NRBC # BLD: 0 K/UL (ref 0–0.01)
NRBC BLD-RTO: 0 PER 100 WBC
PLATELET # BLD AUTO: 272 K/UL (ref 135–420)
PMV BLD AUTO: 10.5 FL (ref 9.2–11.8)
POTASSIUM SERPL-SCNC: 3.9 MMOL/L (ref 3.5–5.5)
RBC # BLD AUTO: 4.43 M/UL (ref 4.2–5.3)
SODIUM SERPL-SCNC: 140 MMOL/L (ref 136–145)
WBC # BLD AUTO: 6.7 K/UL (ref 4.6–13.2)

## 2024-12-17 PROCEDURE — 82550 ASSAY OF CK (CPK): CPT

## 2024-12-17 PROCEDURE — 96374 THER/PROPH/DIAG INJ IV PUSH: CPT

## 2024-12-17 PROCEDURE — 6360000002 HC RX W HCPCS: Performed by: STUDENT IN AN ORGANIZED HEALTH CARE EDUCATION/TRAINING PROGRAM

## 2024-12-17 PROCEDURE — 80048 BASIC METABOLIC PNL TOTAL CA: CPT

## 2024-12-17 PROCEDURE — 96361 HYDRATE IV INFUSION ADD-ON: CPT

## 2024-12-17 PROCEDURE — 85025 COMPLETE CBC W/AUTO DIFF WBC: CPT

## 2024-12-17 PROCEDURE — 73502 X-RAY EXAM HIP UNI 2-3 VIEWS: CPT

## 2024-12-17 PROCEDURE — 73560 X-RAY EXAM OF KNEE 1 OR 2: CPT

## 2024-12-17 PROCEDURE — 96375 TX/PRO/DX INJ NEW DRUG ADDON: CPT

## 2024-12-17 PROCEDURE — 6370000000 HC RX 637 (ALT 250 FOR IP): Performed by: STUDENT IN AN ORGANIZED HEALTH CARE EDUCATION/TRAINING PROGRAM

## 2024-12-17 PROCEDURE — 99284 EMERGENCY DEPT VISIT MOD MDM: CPT

## 2024-12-17 PROCEDURE — 2580000003 HC RX 258: Performed by: STUDENT IN AN ORGANIZED HEALTH CARE EDUCATION/TRAINING PROGRAM

## 2024-12-17 PROCEDURE — 72100 X-RAY EXAM L-S SPINE 2/3 VWS: CPT

## 2024-12-17 RX ORDER — DEXAMETHASONE 4 MG/1
8 TABLET ORAL ONCE
Qty: 2 TABLET | Refills: 0 | Status: SHIPPED | OUTPATIENT
Start: 2024-12-17 | End: 2024-12-17

## 2024-12-17 RX ORDER — DEXAMETHASONE SODIUM PHOSPHATE 4 MG/ML
10 INJECTION, SOLUTION INTRA-ARTICULAR; INTRALESIONAL; INTRAMUSCULAR; INTRAVENOUS; SOFT TISSUE EVERY 6 HOURS
Status: DISCONTINUED | OUTPATIENT
Start: 2024-12-17 | End: 2024-12-20 | Stop reason: HOSPADM

## 2024-12-17 RX ORDER — METHOCARBAMOL 750 MG/1
750 TABLET, FILM COATED ORAL NIGHTLY PRN
Qty: 14 TABLET | Refills: 0 | Status: ON HOLD | OUTPATIENT
Start: 2024-12-17

## 2024-12-17 RX ORDER — METHOCARBAMOL 500 MG/1
1000 TABLET, FILM COATED ORAL ONCE
Status: COMPLETED | OUTPATIENT
Start: 2024-12-17 | End: 2024-12-17

## 2024-12-17 RX ORDER — SODIUM CHLORIDE, SODIUM LACTATE, POTASSIUM CHLORIDE, AND CALCIUM CHLORIDE .6; .31; .03; .02 G/100ML; G/100ML; G/100ML; G/100ML
1000 INJECTION, SOLUTION INTRAVENOUS ONCE
Status: COMPLETED | OUTPATIENT
Start: 2024-12-17 | End: 2024-12-17

## 2024-12-17 RX ORDER — DEXAMETHASONE SODIUM PHOSPHATE 4 MG/ML
10 INJECTION, SOLUTION INTRA-ARTICULAR; INTRALESIONAL; INTRAMUSCULAR; INTRAVENOUS; SOFT TISSUE
Status: COMPLETED | OUTPATIENT
Start: 2024-12-17 | End: 2024-12-17

## 2024-12-17 RX ORDER — LIDOCAINE 50 MG/G
1 PATCH TOPICAL DAILY
Qty: 10 PATCH | Refills: 0 | Status: ON HOLD | OUTPATIENT
Start: 2024-12-17 | End: 2024-12-27

## 2024-12-17 RX ORDER — LIDOCAINE 4 G/G
1 PATCH TOPICAL
Status: DISPENSED | OUTPATIENT
Start: 2024-12-17 | End: 2024-12-18

## 2024-12-17 RX ADMIN — HYDROMORPHONE HYDROCHLORIDE 0.5 MG: 1 INJECTION, SOLUTION INTRAMUSCULAR; INTRAVENOUS; SUBCUTANEOUS at 20:08

## 2024-12-17 RX ADMIN — DEXAMETHASONE SODIUM PHOSPHATE 10 MG: 4 INJECTION INTRA-ARTICULAR; INTRALESIONAL; INTRAMUSCULAR; INTRAVENOUS; SOFT TISSUE at 20:08

## 2024-12-17 RX ADMIN — SODIUM CHLORIDE, POTASSIUM CHLORIDE, SODIUM LACTATE AND CALCIUM CHLORIDE 1000 ML: 600; 310; 30; 20 INJECTION, SOLUTION INTRAVENOUS at 20:59

## 2024-12-17 RX ADMIN — METHOCARBAMOL 1000 MG: 500 TABLET ORAL at 20:07

## 2024-12-17 ASSESSMENT — PAIN DESCRIPTION - DESCRIPTORS
DESCRIPTORS: ACHING
DESCRIPTORS: ACHING

## 2024-12-17 ASSESSMENT — PAIN DESCRIPTION - LOCATION
LOCATION: BACK
LOCATION: BACK

## 2024-12-17 ASSESSMENT — PAIN SCALES - GENERAL
PAINLEVEL_OUTOF10: 8
PAINLEVEL_OUTOF10: 8

## 2024-12-17 ASSESSMENT — PAIN DESCRIPTION - ORIENTATION
ORIENTATION: LOWER
ORIENTATION: LOWER

## 2024-12-18 ENCOUNTER — APPOINTMENT (OUTPATIENT)
Facility: HOSPITAL | Age: 44
End: 2024-12-18
Payer: MEDICAID

## 2024-12-18 PROCEDURE — 97535 SELF CARE MNGMENT TRAINING: CPT

## 2024-12-18 PROCEDURE — 97112 NEUROMUSCULAR REEDUCATION: CPT

## 2024-12-18 PROCEDURE — 94761 N-INVAS EAR/PLS OXIMETRY MLT: CPT

## 2024-12-18 PROCEDURE — 97530 THERAPEUTIC ACTIVITIES: CPT

## 2024-12-18 PROCEDURE — 97162 PT EVAL MOD COMPLEX 30 MIN: CPT

## 2024-12-18 PROCEDURE — 6360000002 HC RX W HCPCS: Performed by: STUDENT IN AN ORGANIZED HEALTH CARE EDUCATION/TRAINING PROGRAM

## 2024-12-18 PROCEDURE — 72148 MRI LUMBAR SPINE W/O DYE: CPT

## 2024-12-18 PROCEDURE — 97166 OT EVAL MOD COMPLEX 45 MIN: CPT

## 2024-12-18 PROCEDURE — 96376 TX/PRO/DX INJ SAME DRUG ADON: CPT

## 2024-12-18 RX ADMIN — DEXAMETHASONE SODIUM PHOSPHATE 10 MG: 4 INJECTION, SOLUTION INTRAMUSCULAR; INTRAVENOUS at 07:58

## 2024-12-18 RX ADMIN — DEXAMETHASONE SODIUM PHOSPHATE 10 MG: 4 INJECTION, SOLUTION INTRAMUSCULAR; INTRAVENOUS at 17:37

## 2024-12-18 RX ADMIN — DEXAMETHASONE SODIUM PHOSPHATE 10 MG: 4 INJECTION, SOLUTION INTRAMUSCULAR; INTRAVENOUS at 00:49

## 2024-12-18 RX ADMIN — DEXAMETHASONE SODIUM PHOSPHATE 10 MG: 4 INJECTION, SOLUTION INTRAMUSCULAR; INTRAVENOUS at 23:57

## 2024-12-18 ASSESSMENT — PAIN DESCRIPTION - DESCRIPTORS: DESCRIPTORS: NUMBNESS;SHARP

## 2024-12-18 ASSESSMENT — PAIN DESCRIPTION - LOCATION: LOCATION: BACK

## 2024-12-18 ASSESSMENT — PAIN SCALES - GENERAL: PAINLEVEL_OUTOF10: 5

## 2024-12-18 ASSESSMENT — PAIN DESCRIPTION - ORIENTATION: ORIENTATION: LOWER

## 2024-12-18 NOTE — CARE COORDINATION
CM met with pt at the bedside, introduced self and explained role. Pt states currently living in [place] on the 2nd floor, pt has a rolling walker. Discussed therapy recommendation for ARU.       Perfect serve message to Melissa watson for ARU, they will review clinicals and let CM know if pt is candidate.    Camilla METZGERN RN  Case Management  [phone]

## 2024-12-18 NOTE — ED NOTES
Received report from KJ Torres    Pt awake and alert in stretcher. Placed on cardiac monitor for morning VS. NAD att.     Pt c/o pain 2/10 while laying and 7/10 while moving. Pt given passed due meds from previous shift.     Pt prepared for MRI. All leads removed. Removed along with 2 gold necklaces placed in pt purse at bedside with her knowledge

## 2024-12-18 NOTE — CONSULTS
[unfilled]   Consult    Patient: Edith Toney MRN: [id number]  SSN: [id number]    YOB: 1980  Age: 44 y.o.  Sex: female      Subjective:      Edith Toney is a 44 y.o. female with PMHx CKD, lupus who is being seen for back and right leg pain/weakness. Patient states she has had ongoing back and leg pain since February of this year. She notes onset of weakness in her right lower extremity two days ago. She reports spasms into her leg. [facility]     Past Medical History:   Diagnosis Date    Chronic kidney disease     Headache     History of EMG/NCV 10/2024    RLE, normal study    Lupus      Past Surgical History:   Procedure Laterality Date     SECTION      TUBAL LIGATION        Family History   Problem Relation Age of Onset    Rheumatologic Disease Maternal Aunt      Social History     Tobacco Use    Smoking status: Former     Current packs/day: 0.50     Average packs/day: 0.5 packs/day for 27.0 years (13.5 ttl pk-yrs)     Types: Cigarettes     Start date: 1998    Smokeless tobacco: Never   Substance Use Topics    Alcohol use: Yes     Comment: special occasions      Current Facility-Administered Medications   Medication Dose Route Frequency Provider Last Rate Last Admin    dexAMETHasone (DECADRON) injection 10 mg  10 mg IntraVENous Q6H Konstantin Farrell Jr., DO   10 mg at 24 0758     Current Outpatient Medications   Medication Sig Dispense Refill    methocarbamol (ROBAXIN-750) 750 MG tablet Take 1 tablet by mouth nightly as needed (back pain) 14 tablet 0    lidocaine (LIDODERM) 5 % Place 1 patch onto the skin daily for 10 days 12 hours on, 12 hours off. 10 patch 0    diclofenac sodium (VOLTAREN) 1 % GEL APPLY 4 GRAMS TO AFFECTED AREA 4 TIMES A DAY      losartan (COZAAR) 25 MG tablet Take 1 tablet by mouth daily      pregabalin (LYRICA) 100 MG capsule Take 1 capsule by mouth 3 times daily for 90 days. Max Daily Amount: 300 mg 90 capsule 2

## 2024-12-18 NOTE — CARE COORDINATION
Perfect serve message send to PT/OT assigned to notify CM once they have completed eval.    Camilla METZGERN RN  Case Management  [phone]

## 2024-12-18 NOTE — PROGRESS NOTES
Spiritual Health History and Assessment/Progress Note  [facility] hospital    (P) Spiritual/Emotional Needs,  ,  ,      Name: Edith Toney MRN: [id number]    Age: 44 y.o.     Sex: female   Language: English   Latter-day: None   <principal problem not specified>     Date: 12/18/2024            Total Time Calculated: (P) 12 min              Spiritual Assessment began in [facility] EMERGENCY DEPT        Referral/Consult From: (P) Other    Encounter Overview/Reason: (P) Spiritual/Emotional Needs  Service Provided For: (P) Patient    Debbie, Belief, Meaning:   Patient identifies as spiritual and has beliefs or practices that help with coping during difficult times  Family/Friends No family/friends present      Importance and Influence:  Patient has spiritual/personal beliefs that influence decisions regarding their health  Family/Friends No family/friends present    Community:  Patient feels well-supported. Support system includes: Children and Extended family  Family/Friends No family/friends present    Assessment and Plan of Care:     Patient Interventions include: Facilitated expression of thoughts and feelings, Affirmed coping skills/support systems, and Provided sacramental/Latter-day ritual  Family/Friends Interventions include: No family/friends present    Patient Plan of Care: Spiritual Care available upon further referral  Family/Friends Plan of Care: No family/friends present    Electronically signed by Chaplain Reddy on 12/18/2024 at 4:32 PM

## 2024-12-18 NOTE — ED NOTES
Hourly rounding completed on pt  Pt in no apparent distress at this time, VSS  Pt on stretcher with eyes closed

## 2024-12-18 NOTE — PROGRESS NOTES
Physical Therapy  Pt not seen for skilled PT due to:    Pending lumbar spine MRI in setting of RLE weakness. Appreciate mobility recommendations following.    Will f/u later as schedule allows. Thank you.  JAYE HardyT

## 2024-12-18 NOTE — PROGRESS NOTES
ARU/IPR REFERRAL CONTACT NOTE  [facility]      Thank you for the opportunity to review this patient's case for admission to [facility].    Referral received: 12/18/2024 time:2:40pm    Based on our pre-admission screening:                          [x ] Our Team/Medical Director is following this case.   Comments: Referral received. Will review with team. Will follow for further medical work up.        Again, Thank you for this referral. Should you have any questions please do not hesitate to call.     Sincerely,  Melissa Kramer    Clinical Liaison  [facility]  [phone]

## 2024-12-19 ENCOUNTER — TELEPHONE (OUTPATIENT)
Age: 44
End: 2024-12-19

## 2024-12-19 PROCEDURE — 97535 SELF CARE MNGMENT TRAINING: CPT

## 2024-12-19 PROCEDURE — 96376 TX/PRO/DX INJ SAME DRUG ADON: CPT

## 2024-12-19 PROCEDURE — 6360000002 HC RX W HCPCS: Performed by: STUDENT IN AN ORGANIZED HEALTH CARE EDUCATION/TRAINING PROGRAM

## 2024-12-19 PROCEDURE — 6370000000 HC RX 637 (ALT 250 FOR IP): Performed by: STUDENT IN AN ORGANIZED HEALTH CARE EDUCATION/TRAINING PROGRAM

## 2024-12-19 PROCEDURE — 94761 N-INVAS EAR/PLS OXIMETRY MLT: CPT

## 2024-12-19 RX ORDER — METHOCARBAMOL 500 MG/1
1500 TABLET, FILM COATED ORAL
Status: COMPLETED | OUTPATIENT
Start: 2024-12-19 | End: 2024-12-19

## 2024-12-19 RX ADMIN — DEXAMETHASONE SODIUM PHOSPHATE 10 MG: 4 INJECTION, SOLUTION INTRAMUSCULAR; INTRAVENOUS at 06:24

## 2024-12-19 RX ADMIN — DEXAMETHASONE SODIUM PHOSPHATE 10 MG: 4 INJECTION, SOLUTION INTRAMUSCULAR; INTRAVENOUS at 17:50

## 2024-12-19 RX ADMIN — DEXAMETHASONE SODIUM PHOSPHATE 10 MG: 4 INJECTION, SOLUTION INTRAMUSCULAR; INTRAVENOUS at 11:51

## 2024-12-19 RX ADMIN — METHOCARBAMOL 1500 MG: 500 TABLET ORAL at 23:14

## 2024-12-19 ASSESSMENT — PAIN SCALES - GENERAL: PAINLEVEL_OUTOF10: 7

## 2024-12-19 ASSESSMENT — PAIN DESCRIPTION - LOCATION: LOCATION: BACK

## 2024-12-19 ASSESSMENT — PAIN DESCRIPTION - DESCRIPTORS: DESCRIPTORS: OTHER (COMMENT)

## 2024-12-19 ASSESSMENT — PAIN DESCRIPTION - ORIENTATION: ORIENTATION: MID;LOWER

## 2024-12-19 NOTE — CARE COORDINATION
CM called [phone] ref #[id number] pt only has medicaid and does not qualify for Skilled Nursing facility.    Camilla METZGERN RN  Case Management  [phone]

## 2024-12-19 NOTE — ED NOTES
Bedside and Verbal shift change report given to KJ Serrato (oncoming nurse) by kj Delaney (offgoing nurse). Report included the following information Nurse Handoff Report, ED Encounter Summary, ED SBAR, MAR, and Recent Results.

## 2024-12-19 NOTE — ED NOTES
Received patient in bed, no complaint voiced at this time. Patient in no obvious respiratory distress. Patient alert and oriented and breathes freely on room air, chest expansion equal and adequate. Peripheral IV access noted to right hand. No abnormalities noted to abdomen. Movement and sensation present to all extremities.  Patient fore case management disposition

## 2024-12-19 NOTE — PROGRESS NOTES
ARU/IPR REFERRAL CONTACT NOTE  [facility]      Thank you for the opportunity to review this patient's case for admission to [facility].    Referral received: 12/18/2024 time:2:40pm    Based on our pre-admission screening:                          [x ] This patient does not meet criteria for admission to [facility] due to:    Pt does not have qualifying rehab diagnosis for ARU. Perfectserve sent to .       Again, Thank you for this referral. Should you have any questions please do not hesitate to call.     Sincerely,  Melissa Kramer    Clinical Liaison  [facility]  [phone]

## 2024-12-19 NOTE — ED NOTES
Patient repositioned. Patient sitting semi mensah, eyes open, blankets pulled up to waist. Skin is warm and dry to touch. No respiratory distress observed. Patient given TV dinner and ice water.

## 2024-12-19 NOTE — TELEPHONE ENCOUNTER
I called and spoke to the pt. The pt was identified using 2 pt identifiers. I explained to her that the order will need to come from the treating providers at the hospital. Ms. Toney verbalized understanding. I asked the pt if she would like for us to mail the rollator walker order to her house since she is the only one that can  the order. She verbalized that this would be ok. The order has been placed in the out going mail bin.

## 2024-12-19 NOTE — CARE COORDINATION
Pt made aware she was declined by ARU, offered alternatives for dispo- home with home health and personal care aides. Pt requesting referral be sent to other ARU facilities, she states \" I want to be able to walk, be independent and perform my ADLs.\" Referrals send to entire state for ARU awaiting response.    Camilla METZGERN RN  Case Management  [phone]

## 2024-12-19 NOTE — CARE COORDINATION
No accepting Acute Rehab facility at this time. Wheelchair ordered to be delivered to the pt room. Home health referral to [facility].    Camilla METZGERN RN  Case Management  [phone]

## 2024-12-19 NOTE — ED NOTES
Patient for home once patient gets wheelchair, address supplied by patient for hotel     [address] [id number]

## 2024-12-19 NOTE — CARE COORDINATION
Pending w/c delivery, then pt can be discharged. Dr. Camara Nickie charge nurse and Meka barry RN made aware.    To check status for delivery [phone] press option 2.    Camilla Casiano BSN RN  Case Management  [phone]

## 2024-12-19 NOTE — CARE COORDINATION
Still no accepting acute in patient rehab facilities at this time. W/C has been ordered to be delivered to the pt room. is agreeable to going home, states she is requesting a room downstairs at the hotel. She can be discharged once her wheelchair is delivered.     Home health with [facility] has been set up confirmed with Candie, will call back with SOC date.    Camilla METZGERN RN  Case Management  [phone]

## 2024-12-19 NOTE — ED NOTES
Bedside and Verbal shift change report given to Kiko RN (oncoming nurse) by Ama RN (offgoing nurse). Report included the following information Nurse Handoff Report, ED Encounter Summary, ED SBAR, Adult Overview, Intake/Output, MAR, Cardiac Rhythm NSR, and Neuro Assessment.

## 2024-12-19 NOTE — PLAN OF CARE
Problem: Occupational Therapy - Adult  Goal: By Discharge: Performs self-care activities at highest level of function for planned discharge setting.  See evaluation for individualized goals.  Description: Occupational Therapy Goals:  Initiated 12/18/2024 to be met within 7-10 days.    1.  Patient will perform bed mobility in preparation for ADLs with minimal assistance.   2.  Patient will perform grooming task seated EOB with supervision/set-up > 5 minutes, G balance.  3.  Patient will perform upper body dressing  with modified independence.  4.  Patient will perform sit <> stand inn preparation for toilet transfers with moderate assistance  5.  Patient will perform all aspects of toileting with moderate assistance.  6.  Patient will participate in upper extremity therapeutic exercise/activities with supervision/set-up for 8-10 minutes to increase strength/endurance for ADLs.    7.  Patient will utilize energy conservation techniques during functional activities with verbal cues.    PLOF: Pt reports since February 2024 she has had increased pain d/t sciatic nerve. Patient pain has increased recently where pt was modified independent with self-care, additional time needed and using a cane for functional mobility.    Outcome: Progressing   OCCUPATIONAL THERAPY TREATMENT    Patient: Edith Toney (44 y.o. female)  Date: 12/19/2024  Diagnosis: No admission diagnoses are documented for this encounter. <principal problem not specified>      Precautions: General Precautions, Fall Risk    Chart, occupational therapy assessment, plan of care, and goals were reviewed.  ASSESSMENT:  Pt is pleasant and cooperative. Motivated to participate w/therapy. Pt demonstrates much improved independence this date, tolerates sitting EOB performing ADL grooming tasks to increase activity tolerance. BLE weakness, R > L, and fear of falling requires MIN A w/STS and lateral steps to HOB using RW. Educated on energy conservation techniques 
  Problem: Physical Therapy - Adult  Goal: By Discharge: Performs mobility at highest level of function for planned discharge setting.  See evaluation for individualized goals.  Description: Physical Therapy Goals:  Initiated 12/18/2024 to be met within 7-10 days.    1.  Patient will move from supine to sit and sit to supine , scoot up and down, and roll side to side in bed with minimal assistance in order to safely get into/out of bed.  2.  Patient will transfer from bed to chair and chair to bed with moderate assistance using RW in order to safely partake in OOB mobility.   3.  Patient will perform sit to stand with moderate assistance in order to safely partake in OOB mobility.  4.  Patient will ambulate with moderate assistance for 15 feet using RW in order to safely navigate household distances.   5.  Patient will ascend/descend 12 stairs with bilateral handrail(s) with moderate assistance in order to safely enter/exit home environment.    PLOF: lives alone in 2nd floor apartment with full flight entry and bilateral rails, independent at baseline using SPC      Outcome: Progressing     PHYSICAL THERAPY EVALUATION    Patient: Edith Toney (44 y.o. female)  Date: 12/18/2024  Primary Diagnosis: No admission diagnoses are documented for this encounter.       Precautions: General Precautions, Fall Risk,  ,  ,  ,  ,  ,  ,      ASSESSMENT :  Patient seen for PT evaluation and treatment. Received supine; agreeable. Presenting with deficits as listed below, impacting patient's safety and independence mobilizing. Most notably impacted by tone, strength, ROM, and sensation deficits in bilateral LE (R more affected than L; however, both involved). Repeat rolling completed with modA. Attempted long sit with maxA; however, extensor tone limiting patient's ability to overcome center of mass to safely progress upright mobility. Transfers deferred due to severe posterior balance loss with attempts at unsupported sitting. 
Nickie OTR/L  Minutes: 42    Eval Complexity: Decision Making: Medium Complexity

## 2024-12-19 NOTE — CARE COORDINATION
Ongoing efforts for disposition. Perfect serve message to Melissa PORTER liaison for update.    Camilla METZGERN RN  Case Management  [phone]

## 2024-12-19 NOTE — TELEPHONE ENCOUNTER
If she is talking about going to a rehab facility for impatient then that has to come through the hospital doctors.

## 2024-12-20 VITALS
TEMPERATURE: 98.7 F | SYSTOLIC BLOOD PRESSURE: 131 MMHG | DIASTOLIC BLOOD PRESSURE: 85 MMHG | RESPIRATION RATE: 18 BRPM | BODY MASS INDEX: 35.78 KG/M2 | OXYGEN SATURATION: 98 % | WEIGHT: 215 LBS | HEART RATE: 58 BPM

## 2024-12-20 PROCEDURE — 94761 N-INVAS EAR/PLS OXIMETRY MLT: CPT

## 2024-12-20 PROCEDURE — 6360000002 HC RX W HCPCS: Performed by: STUDENT IN AN ORGANIZED HEALTH CARE EDUCATION/TRAINING PROGRAM

## 2024-12-20 PROCEDURE — 96376 TX/PRO/DX INJ SAME DRUG ADON: CPT

## 2024-12-20 RX ADMIN — DEXAMETHASONE SODIUM PHOSPHATE 10 MG: 4 INJECTION, SOLUTION INTRAMUSCULAR; INTRAVENOUS at 00:00

## 2024-12-20 RX ADMIN — DEXAMETHASONE SODIUM PHOSPHATE 10 MG: 4 INJECTION, SOLUTION INTRAMUSCULAR; INTRAVENOUS at 11:02

## 2024-12-20 NOTE — ED NOTES
Bedside and Verbal shift change report given to Luis (oncoming nurse) by Kiko (offgoing nurse). Report included the following information Index, ED Encounter Summary, ED SBAR.

## 2024-12-20 NOTE — CARE COORDINATION
CM called [facility] medicaid transportation [phone], spoke with O[place]ou. Its going to be a 3 hour window.    Trip #[id number]    Camilla KING RN  Case Management  [phone]

## 2024-12-20 NOTE — ED PROVIDER NOTES
EMERGENCY DEPARTMENT HISTORY AND PHYSICAL EXAM      Date: 12/17/2024  Patient Name: Edith Toney    History of Presenting Illness     Chief Complaint   Patient presents with    Back Pain       History (Context): Edith Toney is a 44 y.o. female  presents to the ED today with chief complaint of back pain, right hip pain, and right lower extremity pain.  Patient states that she has a history of lupus as well as radiculopathy from a disc herniation.  Currently followed up with physical medicine and rehabilitation for her symptoms and on gabapentin.  States that she felt briefly weak and slid herself to the ground.  Since that time she has had significant pain to her back, and right lower extremity which is worsened with ambulation and movement.  Denies any head trauma or loss of consciousness.  Currently not on any anticoagulation.  States that she is unable to ambulate secondary to the pain and weakness at this time.  Denies any further complaints.    Per EMS prior to their arrival patient did not have tremors to the right lower extremity however upon moving her to the stretcher and noticed that she was unable to control these tremors.      PCP: Anne Torres MD    Current Facility-Administered Medications   Medication Dose Route Frequency Provider Last Rate Last Admin    lidocaine 4 % external patch 1 patch  1 patch TransDERmal NOW Konstantin Farrell Jr., DO        dexAMETHasone (DECADRON) injection 10 mg  10 mg IntraVENous Q6H Konstantin Farrell Jr., DO   10 mg at 12/18/24 0049     Current Outpatient Medications   Medication Sig Dispense Refill    methocarbamol (ROBAXIN-750) 750 MG tablet Take 1 tablet by mouth nightly as needed (back pain) 14 tablet 0    lidocaine (LIDODERM) 5 % Place 1 patch onto the skin daily for 10 days 12 hours on, 12 hours off. 10 patch 0    diclofenac sodium (VOLTAREN) 1 % GEL APPLY 4 GRAMS TO AFFECTED AREA 4 TIMES A DAY      losartan (COZAAR) 25 MG tablet Take 1 tablet by mouth daily   
 I received the patient in turnover from Dr. Camara at the end of his shift.  The patient is a 44-year-old woman who presented to the ED with back pain, right hip pain, and right lower extremity pain.  At this time, she is awaiting case management for wheelchair.  
episodic weakness without findings in the past.  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  Will continue to monitor patient. [DV]   0647 6:47 AM :Pt care assumed from Dr. Farrell , ED provider. Pt complaint(s), current treatment plan, progression and available diagnostic results have been discussed thoroughly. The patient was seen and evaluated on my shift.   Rounding occurred: Yes  Intended Disposition: admit  Pending diagnostic reports and/or labs (please list): mri, then admit    Upon rounding on patient patient states that she does not really have any pain in the leg but her right leg keeps jerking  Strength equal bilateral lower extremities though slightly decreased effort with right lower extremity secondary to comfortability.  Pending MRI and pending admission [NF]   1617 Spoke to case management patient will be staying overnight there is some consideration for acute rehab admission.   will reevaluate patient. [NF]   1617 MRI does not show anything that requires surgical intervention.  Patient will not be admitted at this time. [NF]      ED Course User Index  [DV] Konstantin Farrell Jr., DO  [NF] Jon Hernandez MD Frith, Nikea, MD  12/18/24 9656       Jon Hernandez MD  12/18/24 1005    
episodic weakness without findings in the past.  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  Will continue to monitor patient. [DV]   0647 6:47 AM :Pt care assumed from Dr. Farrell , ED provider. Pt complaint(s), current treatment plan, progression and available diagnostic results have been discussed thoroughly. The patient was seen and evaluated on my shift.   Rounding occurred: Yes  Intended Disposition: admit  Pending diagnostic reports and/or labs (please list): mri, then admit    Upon rounding on patient patient states that she does not really have any pain in the leg but her right leg keeps jerking  Strength equal bilateral lower extremities though slightly decreased effort with right lower extremity secondary to comfortability.  Pending MRI and pending admission [NF]   1617 Spoke to case management patient will be staying overnight there is some consideration for acute rehab admission.   will reevaluate patient. [NF]   1617 MRI does not show anything that requires surgical intervention.  Patient will not be admitted at this time. [NF]   Thu Dec 19, 2024   1619 Mi to ks 45 yo female cc- back pain/ can't walk? / workup- mri is unremarkable/ plan- pending dispo home after she gets her wheelchair.  [KS]   Fri Dec 20, 2024   0608 EB to JG- case management to help with discharge with wheelchair [JG]   0851 Case management here to see patient [JG]   1328 Per case management the wheelchair should be delivered by 4 pm [JG]   1358  notes that patient has received her wheelchair and is ready to be discharged [JG]      ED Course User Index  [DV] Konstantin Farrell Jr.,   [JG] Fartun Power DO  [KS] Ross Camara MD  [NF] Jon Hernandez MD Gillman, Jessica, DO  12/20/24 9077

## 2024-12-20 NOTE — CARE COORDINATION
CM called [employer] [phone]  pt has an outstanding debt, therefore could not deliver the wheelchair. They stated they tried to reach to the pt and were unsuccessful. Pt given the number [phone] to talk to their billing.    Addendum 6060  Pt called back stating she owes [employer] $54, and will need to tall to their billing department. Advised pt find a way to pay the balance so that we can safely discharge her.    Addendum 0906  New order placed through [employer], called rep Holly [phone], she will call back with ETA.      Addendum 1026  Updated ETA for the wheelchair 12-4pm    Addendum 1413  Home health SOC date 12/24 through [facility], contact info updated in follow up.     No further CM needs identified at this time.      Camilla METZGERN RN  Case Management  [phone]

## 2024-12-21 PROBLEM — R29.898 WEAKNESS OF BOTH LOWER EXTREMITIES: Status: ACTIVE | Noted: 2024-12-21

## 2025-03-12 ENCOUNTER — TELEPHONE (OUTPATIENT)
Age: 45
End: 2025-03-12

## 2025-03-12 NOTE — TELEPHONE ENCOUNTER
I spoke to Izabel regarding this message. The last couple of prescriptions have come from the surgeon's office. She verbalized that the pt should be getting weaning instructions from her surgeon. I called and spoke to the pt. The pt was identified using 2 pt identifiers.  She was informed of this and verbalized understanding. She states that she is currently at their office and will ask them.

## 2025-03-12 NOTE — TELEPHONE ENCOUNTER
Patient called for .    Patient would like to know if she could discontinue taking the Lyrica Medication.    Patient said that she had a Major Surgery done a few months ago and is having some leg swelling.     Patient is asking for a call back from  nurse.     Patient tel. [phone].

## 2025-07-01 NOTE — PROGRESS NOTES
1. Have you been to the ER, urgent care clinic since your last visit? Hospitalized since your last visit? NO    2. Have you seen or consulted any other health care providers outside of the [facility] since your last visit? Include any pap smears or colon screening.  NO Topical Sulfur Applications Pregnancy And Lactation Text: This medication is considered safe during pregnancy and breast feeding secondary to limited systemic absorption.